# Patient Record
Sex: FEMALE | Race: WHITE | Employment: UNEMPLOYED | URBAN - NONMETROPOLITAN AREA
[De-identification: names, ages, dates, MRNs, and addresses within clinical notes are randomized per-mention and may not be internally consistent; named-entity substitution may affect disease eponyms.]

---

## 2021-04-07 ENCOUNTER — OFFICE VISIT (OUTPATIENT)
Dept: INTERNAL MEDICINE CLINIC | Age: 21
End: 2021-04-07
Payer: COMMERCIAL

## 2021-04-07 VITALS
SYSTOLIC BLOOD PRESSURE: 111 MMHG | BODY MASS INDEX: 36.14 KG/M2 | DIASTOLIC BLOOD PRESSURE: 72 MMHG | WEIGHT: 204 LBS | TEMPERATURE: 97.1 F | HEIGHT: 63 IN | HEART RATE: 91 BPM

## 2021-04-07 DIAGNOSIS — F11.20 SEVERE OPIOID USE DISORDER (HCC): Primary | ICD-10-CM

## 2021-04-07 DIAGNOSIS — Z3A.27 27 WEEKS GESTATION OF PREGNANCY: ICD-10-CM

## 2021-04-07 PROCEDURE — G8417 CALC BMI ABV UP PARAM F/U: HCPCS | Performed by: INTERNAL MEDICINE

## 2021-04-07 PROCEDURE — 80305 DRUG TEST PRSMV DIR OPT OBS: CPT | Performed by: INTERNAL MEDICINE

## 2021-04-07 PROCEDURE — 99204 OFFICE O/P NEW MOD 45 MIN: CPT | Performed by: INTERNAL MEDICINE

## 2021-04-07 PROCEDURE — 4004F PT TOBACCO SCREEN RCVD TLK: CPT | Performed by: INTERNAL MEDICINE

## 2021-04-07 PROCEDURE — G8427 DOCREV CUR MEDS BY ELIG CLIN: HCPCS | Performed by: INTERNAL MEDICINE

## 2021-04-07 RX ORDER — LEVOTHYROXINE SODIUM 75 UG/1
CAPSULE ORAL
COMMUNITY

## 2021-04-07 RX ORDER — TRAZODONE HYDROCHLORIDE 50 MG/1
TABLET ORAL
COMMUNITY
Start: 2020-06-22

## 2021-04-07 RX ORDER — FLUOXETINE HYDROCHLORIDE 20 MG/1
40 CAPSULE ORAL DAILY
COMMUNITY

## 2021-04-07 RX ORDER — QUETIAPINE FUMARATE 100 MG/1
25 TABLET, FILM COATED ORAL NIGHTLY
COMMUNITY
Start: 2020-09-01

## 2021-04-07 SDOH — HEALTH STABILITY: MENTAL HEALTH: HOW MANY STANDARD DRINKS CONTAINING ALCOHOL DO YOU HAVE ON A TYPICAL DAY?: NOT ASKED

## 2021-04-07 SDOH — ECONOMIC STABILITY: TRANSPORTATION INSECURITY
IN THE PAST 12 MONTHS, HAS LACK OF TRANSPORTATION KEPT YOU FROM MEETINGS, WORK, OR FROM GETTING THINGS NEEDED FOR DAILY LIVING?: NOT ASKED

## 2021-04-07 SDOH — ECONOMIC STABILITY: FOOD INSECURITY: WITHIN THE PAST 12 MONTHS, YOU WORRIED THAT YOUR FOOD WOULD RUN OUT BEFORE YOU GOT MONEY TO BUY MORE.: PATIENT DECLINED

## 2021-04-07 ASSESSMENT — PATIENT HEALTH QUESTIONNAIRE - PHQ9
SUM OF ALL RESPONSES TO PHQ9 QUESTIONS 1 & 2: 1
SUM OF ALL RESPONSES TO PHQ QUESTIONS 1-9: 1

## 2021-04-07 NOTE — PROGRESS NOTES
MEDICATION ASSISTED TREATMENT ENCOUNTER    HISTORY OF PRESENT ILLNESS  Patient presents for evaluation of opioid use disorder and wants to be placed on medication assisted treatment  Patient is referred here by the [facility] program urges with her  Patient has had problems using heroin, pain pills  Patient started using she said she started heroin at age 16  Was using pain pills at age 16-14    Patient says she is 32 weeks pregnant she is seeing Dr. Divya Reynolds  She said her last use of an opiate was November 4 she used Percocet and Oxy  Other drugs used: THC prior to her pregnancy  Suboxone programs in the past no  Vivitrol in the past no  Patient says she has been having some strong urges to use   says she actually contacted her dealer  He did not did not show up and she said she coped with it  Patient would typically use she was snorted a line of PERC/Inocente daily  Ever used Suboxone off the street no  She had 1 rehab stent in [place] last year, she was there 9 days  Past Medical History:   Diagnosis Date    ADHD     Anxiety     [facility]     Bulimia     [facility]ce[facility]     Hypertension     Hypothyroid     OCD (obsessive compulsive disorder)     Panic disorder     PTSD (post-traumatic stress disorder)        Past Surgical History:   Procedure Laterality Date    ACHILLES TENDON SURGERY      WISDOM TOOTH EXTRACTION         PAST PSYCHIATRIC HISTORY: OCD, PTSD, panic disorder, bipolar    No Known Allergies    Current Outpatient Medications   Medication Sig Dispense Refill    Levothyroxine Sodium 75 MCG CAPS Take by mouth      FLUoxetine (PROZAC) 20 MG capsule Take 40 mg by mouth daily      QUEtiapine (SEROQUEL) 100 MG tablet Take 75 mg by mouth nightly      traZODone (DESYREL) 50 MG tablet       Prenatal Vit-DSS-Fe Cbn-FA (PRENATAL AD PO) Take by mouth      buprenorphine (SUBUTEX) 2 MG SUBL SL tablet Place 2 tablets under the tongue 2 times daily for 4 days. 16 tablet 0     No current facility-administered medications for this visit. 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     Support system biological mother and boyfriend are supportive, stepparents are somewhat supportive     Legal issues no retirement or FDC     Tobacco: Smoking about 4 months ago     Alcohol no alcohol since October ROS     General: Patient denies fevers, chills ,weight changes, sweats     Psych: No depression, anxiety, suicidal ideation or attempts     Endocrine: No thyroid issues,no neck pain, no galactorrhea, no weight changes     Pulmonary: No shortness of breath, orthopnea, PND     Cardiac: No chest pain,syncope, no history of cardiac issues     GI: No trouble with bowels, no abdominal pain     : No dysuria, nocturia, urgency, frequency     MS: Patient denies bone or joint aches, no myalgias     Neuro: Patient denies headaches, seizures, tremors     Skin: No skin lesions, rashes    PHYSICAL EXAM    Blood pressure 111/72, pulse 91, temperature 97.1 °F (36.2 °C), height 5' 3\" (1.6 m), weight 204 lb (92.5 kg).              General: Patient resting comfortably in no acute distress     Mental Status Examination:  Level of consciousness:  within normal limits and awake  Appearance:  well-appearing, in chair, good grooming and good hygiene  Behavior/Motor:  {Normal  Attitude toward examiner:  cooperative and attentive  Speech:  spontaneous and normal volume  Mood: normal  Affect:  appropriate  Thought processes:  linear  Thought content:  Denies homicidal ideations  Suicidal Ideation:  denies suicidal ideation  Delusions:  no evidence of delusions  Perceptual Disturbance:  denies any perceptual disturbance  Cognition:  oriented to person, place, and time    Insight : good Judgment: good  Medication Side Effects:none       Eyes: Pupils are normal         Skin: No rashes, lesions or abnormalities noted        URINE DRUG SCREEN TODAY:  Alcohol, Urine 04/08/2021 11:24 AM Unknown   NEG    Amphetamine Screen, Urine 04/08/2021 11:24 AM Unknown   NEG    Barbiturate Screen, Urine 04/08/2021 11:24 AM Unknown   NEG    Benzodiazepine Screen, Urine 04/08/2021 11:24 AM Unknown   NEG    Buprenorphine Urine 04/08/2021 11:24 AM Unknown   POS    Cocaine Metabolite Screen, Urine 04/08/2021 11:24 AM Unknown   NEG    FENTANYL SCREEN, URINE 04/08/2021 11:24 AM Unknown   NEG    Gabapentin Screen, Urine 04/08/2021 11:24 AM Unknown   N/A    MDMA, Urine 04/08/2021 11:24 AM Unknown   NEG    Methadone Screen, Urine 04/08/2021 11:24 AM Unknown   NEG    Methamphetamine, Urine 04/08/2021 11:24 AM Unknown   NEG    Opiate Scrn, Ur 04/08/2021 11:24 AM Unknown   NEG    Oxycodone Screen, Ur 04/08/2021 11:24 AM Unknown   NEG    PCP Screen, Urine 04/08/2021 11:24 AM Unknown   NEG    Propoxyphene Screen, Urine 04/08/2021 11:24 AM Unknown   N/A    Synthetic Cannabinoids (K2) Screen, Urine 04/08/2021 11:24 AM Unknown   NEG    THC Screen, Urine 04/08/2021 11:24 AM Unknown   NEG    Tramadol Scrn, Ur 04/08/2021 11:24 AM Unknown   NEG    Tricyclic Antidepressants, Urine 04/08/2021 11:24 AM Unknown   N/A             Diagnosis Orders   1. Severe opioid use disorder (HCC)  POCT Rapid Drug Screen   2. 27 weeks gestation of pregnancy           PLAN:  Provider provided education on Medication Assisted Treatment options, including: Suboxone, Sublocade, Methadone, and Naltrexone/Vivitrol  Allowed opportunity to respond to questions regarding the treatment options. Patient voices that their treatment preference is suboxone. I feel that this patient is an appropriate candidate for this treatment option.      Education given on the importance of combining Medication Assisted Treatment with comprehensive treatment, including: individual counseling, treatment groups, community support groups, and psychiatry as applicable. Patient will meet with  to review clinic counseling expectations and to be linked to appropriate services. Provider reviewed medication contract with patient. Patient is agreeable to the program expectations. Both patient and provider signed the medication contract. Patient instructed to go to [address] [place] [address] to watch a video and learn about [facility]. I told patient [facility] is an opioid antagonist that reverses respiratory depression caused by opioids. Pharmacy will give patient or family member [facility] and explain how to use in an emergency.   I reviewed the [place] Automated [facility] Reporting System report     There does not appear to be any discrepancies or overprescribing of controlled substances  Patient was given Subutex 4 mg  She was monitored for 15 minutes  There were no acute complications  I will give her 4 mg twice daily see her back tomorrow  She is here with her

## 2021-04-08 ENCOUNTER — OFFICE VISIT (OUTPATIENT)
Dept: INTERNAL MEDICINE CLINIC | Age: 21
End: 2021-04-08
Payer: COMMERCIAL

## 2021-04-08 VITALS
BODY MASS INDEX: 36.32 KG/M2 | SYSTOLIC BLOOD PRESSURE: 136 MMHG | TEMPERATURE: 97.4 F | HEIGHT: 63 IN | WEIGHT: 205 LBS | HEART RATE: 79 BPM | DIASTOLIC BLOOD PRESSURE: 88 MMHG

## 2021-04-08 DIAGNOSIS — Z51.81 ENCOUNTER FOR MONITORING SUBUTEX MAINTENANCE THERAPY: ICD-10-CM

## 2021-04-08 DIAGNOSIS — Z79.899 ENCOUNTER FOR MONITORING SUBUTEX MAINTENANCE THERAPY: ICD-10-CM

## 2021-04-08 DIAGNOSIS — Z3A.27 27 WEEKS GESTATION OF PREGNANCY: ICD-10-CM

## 2021-04-08 DIAGNOSIS — F11.20 SEVERE OPIOID USE DISORDER (HCC): Primary | ICD-10-CM

## 2021-04-08 LAB
ALCOHOL URINE: ABNORMAL
AMPHETAMINE SCREEN, URINE: ABNORMAL
BARBITURATE SCREEN, URINE: ABNORMAL
BENZODIAZEPINE SCREEN, URINE: ABNORMAL
BUPRENORPHINE URINE: ABNORMAL
COCAINE METABOLITE SCREEN URINE: ABNORMAL
FENTANYL SCREEN, URINE: ABNORMAL
GABAPENTIN SCREEN, URINE: ABNORMAL
MDMA URINE: ABNORMAL
METHADONE SCREEN, URINE: ABNORMAL
METHAMPHETAMINE, URINE: ABNORMAL
OPIATE SCREEN URINE: ABNORMAL
OXYCODONE SCREEN URINE: ABNORMAL
PHENCYCLIDINE SCREEN URINE: ABNORMAL
PROPOXYPHENE SCREEN, URINE: ABNORMAL
SYNTHETIC CANNABINOIDS(K2) SCREEN, URINE: ABNORMAL
THC SCREEN, URINE: ABNORMAL
TRAMADOL SCREEN URINE: ABNORMAL
TRICYCLIC ANTIDEPRESSANTS, UR: ABNORMAL

## 2021-04-08 PROCEDURE — 99213 OFFICE O/P EST LOW 20 MIN: CPT | Performed by: INTERNAL MEDICINE

## 2021-04-08 PROCEDURE — 80305 DRUG TEST PRSMV DIR OPT OBS: CPT | Performed by: INTERNAL MEDICINE

## 2021-04-08 PROCEDURE — G8427 DOCREV CUR MEDS BY ELIG CLIN: HCPCS | Performed by: INTERNAL MEDICINE

## 2021-04-08 PROCEDURE — 4004F PT TOBACCO SCREEN RCVD TLK: CPT | Performed by: INTERNAL MEDICINE

## 2021-04-08 PROCEDURE — G8417 CALC BMI ABV UP PARAM F/U: HCPCS | Performed by: INTERNAL MEDICINE

## 2021-04-08 RX ORDER — BUPRENORPHINE 2 MG/1
4 TABLET SUBLINGUAL 2 TIMES DAILY
Qty: 16 TABLET | Refills: 0 | Status: SHIPPED | OUTPATIENT
Start: 2021-04-08 | End: 2021-04-12 | Stop reason: SDUPTHER

## 2021-04-08 NOTE — PROGRESS NOTES
MEDICATION ASSISTED TREATMENT ENCOUNTER    HISTORY OF PRESENT ILLNESS  Patient presents for evaluation of opioid use disorder and wants to be placed on medication assisted treatment  [facility]   I saw him here for the first time 4/7  We did a Subutex induction I sent her home with 2 doses to last until today  Apparently she had some nausea and vomiting when she got back to [facility]  The nurse there took it upon herself to just hold her Subutex  Patient has had problems using heroin, pain pills  Patient started using she said she started heroin at age 16  Was using pain pills at age [age]    Patient says she is 32 weeks pregnant she is seeing Dr. Octavio Nolan  She said her last use of an opiate was November 4 she used Percocet and Oxy  Other drugs used: THC prior to her pregnancy  Suboxone programs in the past no  Vivitrol in the past no  Patient says she has been having some strong urges to use   says she actually contacted her dealer  He did not did not show up and she said she coped with it  Patient would typically use she was snorted a line of PERC/Oxy daily  Ever used Suboxone off the street no  She had 1 rehab stent in [place] last year, she was there 9 days  Past Medical History:   Diagnosis Date    ADHD     Anxiety     [facility]     Bulimia     [facility]     Hypertension     Hypothyroid     OCD (obsessive compulsive disorder)     Panic disorder     PTSD (post-traumatic stress disorder)              ROS     General:Patient is feeling well  Patient is not experiencing  withdrawal symptoms ,no urges or cravings  Patient is not having any side effects from the buprenorphine     PHYSICAL EXAM    Blood pressure 136/88, pulse 79, temperature 97.4 °F (36.3 °C), height 5' 3\" (1.6 m), weight 205 lb (93 kg).              General: Patient resting comfortably in no acute distress     Mental Status Examination:  Level of consciousness:  within normal limits and awake  Appearance:  well-appearing, in chair, good grooming and good hygiene  Behavior/Motor:  {Normal  Attitude toward examiner:  cooperative and attentive  Speech:  spontaneous and normal volume  Mood: normal  Affect:  appropriate  Thought processes:  linear  Thought content:  Denies homicidal ideations  Suicidal Ideation:  denies suicidal ideation  Delusions:  no evidence of delusions  Perceptual Disturbance:  denies any perceptual disturbance  Cognition:  oriented to person, place, and time    Insight : good  Judgment: good  Medication Side Effects:none       Eyes: Pupils are normal         Skin: No rashes, lesions or abnormalities noted        URINE DRUG SCREEN TODAY:  Recent Labs     04/08/21  1124   ALCOHOL NEG   LABAMPH NEG   LABBARB NEG   LABBENZ NEG   BUPRENUR POS   COCAIMETSCRU NEG   FENTSCRUR NEG   GABAPENTIN N/A   MDMA NEG   METAMPU NEG   LABMETH NEG   OPIATESCREENURINE NEG   OXTCOSU NEG   PHENCYCLIDINESCREENURINE NEG   PROPOXYPHENE N/A   SPICEUR NEG   THCSCREENUR NEG   TRAMADOLUR NEG   TRICYUR N/A           Diagnosis Orders   1. Severe opioid use disorder (HCC)  POCT Rapid Drug Screen    buprenorphine (SUBUTEX) 2 MG SUBL SL tablet   2. 27 weeks gestation of pregnancy     3.  Encounter for monitoring Subutex maintenance therapy           PLAN:  I [facility] with [facility] who told her to hold the medicine  She said she had nausea and vomiting  I reminded her that the patient is pregnant  She had no adverse reaction yesterday here after she was given Subutex  The patient says that she told her that her baby is going to be born addicted being on Subutex  She denies to me that she told her that  I recommend Subutex 4 mg twice daily  I will see her back on Monday

## 2021-04-08 NOTE — PROGRESS NOTES
Verbal order per Dr. Javy Mcqueen for urine drug screen. Positive for BUP. Verified results with Irina Guerrero RN. Dr. Javy Mcqueen ordered Subutex 8mg tab (2 tabs BID)  for patient. Verified dose with patient. Patient was sent home with 4 day script of Subutex 8mg tab (2 tabs BID) and will be seen back in the office 4/12/21.

## 2021-04-09 ENCOUNTER — TELEPHONE (OUTPATIENT)
Dept: INTERNAL MEDICINE CLINIC | Age: 21
End: 2021-04-09

## 2021-04-12 ENCOUNTER — OFFICE VISIT (OUTPATIENT)
Dept: INTERNAL MEDICINE CLINIC | Age: 21
End: 2021-04-12
Payer: COMMERCIAL

## 2021-04-12 VITALS
BODY MASS INDEX: 35.97 KG/M2 | WEIGHT: 203 LBS | TEMPERATURE: 97.7 F | HEART RATE: 98 BPM | SYSTOLIC BLOOD PRESSURE: 112 MMHG | DIASTOLIC BLOOD PRESSURE: 68 MMHG | HEIGHT: 63 IN

## 2021-04-12 DIAGNOSIS — Z79.899 ENCOUNTER FOR MONITORING SUBUTEX MAINTENANCE THERAPY: ICD-10-CM

## 2021-04-12 DIAGNOSIS — Z51.81 ENCOUNTER FOR MONITORING SUBUTEX MAINTENANCE THERAPY: ICD-10-CM

## 2021-04-12 DIAGNOSIS — F11.20 SEVERE OPIOID USE DISORDER (HCC): Primary | ICD-10-CM

## 2021-04-12 DIAGNOSIS — Z3A.15 15 WEEKS GESTATION OF PREGNANCY: ICD-10-CM

## 2021-04-12 PROCEDURE — 4004F PT TOBACCO SCREEN RCVD TLK: CPT | Performed by: INTERNAL MEDICINE

## 2021-04-12 PROCEDURE — G8417 CALC BMI ABV UP PARAM F/U: HCPCS | Performed by: INTERNAL MEDICINE

## 2021-04-12 PROCEDURE — G8427 DOCREV CUR MEDS BY ELIG CLIN: HCPCS | Performed by: INTERNAL MEDICINE

## 2021-04-12 PROCEDURE — 80305 DRUG TEST PRSMV DIR OPT OBS: CPT | Performed by: INTERNAL MEDICINE

## 2021-04-12 PROCEDURE — 99213 OFFICE O/P EST LOW 20 MIN: CPT | Performed by: INTERNAL MEDICINE

## 2021-04-12 RX ORDER — BUPRENORPHINE 2 MG/1
4 TABLET SUBLINGUAL 2 TIMES DAILY
Qty: 56 TABLET | Refills: 0 | Status: SHIPPED | OUTPATIENT
Start: 2021-04-12 | End: 2021-04-26 | Stop reason: SDUPTHER

## 2021-04-12 NOTE — PROGRESS NOTES
MEDICATION ASSISTED TREATMENT ENCOUNTER    HISTORY OF PRESENT ILLNESS  Patient presents for evaluation of opioid use disorder and wants to be placed on medication assisted treatment  [facility] is [facility] by [facility]   I saw him here for the first time 4/7  We did a Subutex induction I sent her home with 2 doses to last until last visit  Apparently she had some nausea and vomiting when she got back to [facility]  The nurse there took it upon herself to just hold her Subutex  I spoke with the director last week told her that was not appropriate without speaking to the physician  She said she was having nausea, I reminded her she is pregnant  Patient has had problems using heroin, pain pills  Patient started using she said she started heroin at age 16  Was using pain pills at age [age]    Patient says she is 32 weeks pregnant she is seeing Dr. Hannah Maldonado  She said her last use of an opiate was November 4 she used Percocet and Oxy  Other drugs used: THC prior to her pregnancy  Suboxone programs in the past no  Vivitrol in the past no  Patient says she has been having some strong urges to use   says she actually contacted her dealer  He did not did not show up and she said she coped with it  Patient would typically use she was snorted a line of PERC/Oxy daily  Ever used Suboxone off the street no  She had 1 rehab stent in [place] last year, she was there 9 days  Past Medical History:   Diagnosis Date    ADHD     Anxiety     [facility] ([facility]     Bulimia     [facility]cerebral palsy) ([facility]     Hypertension     Hypothyroid     OCD (obsessive compulsive disorder)     Panic disorder     PTSD (post-traumatic stress disorder)              ROS     General:Patient is feeling well  Patient is not experiencing  withdrawal symptoms ,no urges or cravings  Patient is not having any side effects from the buprenorphine     PHYSICAL EXAM    Blood pressure 112/68, pulse 98, temperature 97.7 °F (36.5 °C), height 5' 3\" (1.6 m), weight 203 lb (92.1 kg). General: Patient resting comfortably in no acute distress     Mental Status Examination:  Level of consciousness:  within normal limits and awake  Appearance:  well-appearing, in chair, good grooming and good hygiene  Behavior/Motor:  {Normal  Attitude toward examiner:  cooperative and attentive  Speech:  spontaneous and normal volume  Mood: normal  Affect:  appropriate  Thought processes:  linear  Thought content:  Denies homicidal ideations  Suicidal Ideation:  denies suicidal ideation  Delusions:  no evidence of delusions  Perceptual Disturbance:  denies any perceptual disturbance  Cognition:  oriented to person, place, and time    Insight : good  Judgment: good  Medication Side Effects:none       Eyes: Pupils are normal         Skin: No rashes, lesions or abnormalities noted        URINE DRUG SCREEN TODAY:  Recent Labs     04/12/21  1330   ALCOHOL NEG   LABAMPH NEG   LABBARB NEG   LABBENZ NEG   BUPRENUR POS   COCAIMETSCRU NEG   FENTSCRUR NEG   GABAPENTIN N/A   MDMA NEG   METAMPU NEG   LABMETH NEG   OPIATESCREENURINE NEG   OXTCOSU NEG   PHENCYCLIDINESCREENURINE NEG   PROPOXYPHENE N/A   SPICEUR NEG   THCSCREENUR NEG   TRAMADOLUR NEG   TRICYUR N/A           Diagnosis Orders   1. Severe opioid use disorder (HCC)  POCT Rapid Drug Screen    buprenorphine (SUBUTEX) 2 MG SUBL SL tablet   2.  Encounter for monitoring Subutex maintenance therapy     3. 15 weeks gestation of pregnancy           PLAN:  Patient is doing well on the current dose  She has no urges or cravings    I recommend Subutex 4 mg twice daily  I will see her back in 2 weeks

## 2021-04-12 NOTE — TELEPHONE ENCOUNTER
Received call from patient and  Marcos Mayorga. She is unable to fill her Subutex, she tells me they need to verify that she is pregnant. Reviewed notes from Dr. Desmond Juan, and called [address] [place] [address]. They advised me, her Subutex is a prior Auth. The office is closed, I am unable to provide samples. The total cost of her prescription will be just under $15.  Returned call to [facility] and explained the situation that she  would need to pay cash for her prescription over the weekend. They verbalized understanding.

## 2021-04-12 NOTE — PROGRESS NOTES
Verbal order per Dr. Jonn Llamas for urine drug screen. Positive for BUP. Verified results with Marcel Anderson LPN. Dr. Jonn Llamas ordered Subutex 4 mg BID for patient. Verified dose with patient. Patient was sent home with 1 week script for Subutex 2 mg tab (2 tabs BID) and will be seen back in the office on 4/26/21. Voucher sent.

## 2021-04-15 ENCOUNTER — HOSPITAL ENCOUNTER (OUTPATIENT)
Age: 21
Discharge: HOME | End: 2021-04-16
Attending: OBSTETRICS & GYNECOLOGY | Admitting: OBSTETRICS & GYNECOLOGY
Payer: COMMERCIAL

## 2021-04-16 VITALS
RESPIRATION RATE: 20 BRPM | WEIGHT: 203 LBS | TEMPERATURE: 98.1 F | HEART RATE: 63 BPM | DIASTOLIC BLOOD PRESSURE: 79 MMHG | HEIGHT: 63 IN | BODY MASS INDEX: 35.97 KG/M2 | OXYGEN SATURATION: 97 % | SYSTOLIC BLOOD PRESSURE: 127 MMHG

## 2021-04-16 PROBLEM — R10.9 ABDOMINAL PAIN: Status: ACTIVE | Noted: 2021-04-16

## 2021-04-16 LAB
BACTERIA: ABNORMAL
BILIRUBIN URINE: NEGATIVE
BLOOD, URINE: NEGATIVE
CASTS: ABNORMAL /LPF
CASTS: ABNORMAL /LPF
CHARACTER, URINE: CLEAR
COLOR: YELLOW
CRYSTALS: ABNORMAL
EPITHELIAL CELLS, UA: ABNORMAL /HPF
GLUCOSE, URINE: NEGATIVE MG/DL
KETONES, URINE: NEGATIVE
LEUKOCYTE EST, POC: ABNORMAL
MISCELLANEOUS LAB TEST RESULT: ABNORMAL
NITRITE, URINE: NEGATIVE
PH UA: 7.5 (ref 5–9)
PROTEIN UA: NEGATIVE MG/DL
RBC URINE: ABNORMAL /HPF
RENAL EPITHELIAL, UA: ABNORMAL
SPECIFIC GRAVITY UA: 1.01 (ref 1–1.03)
UROBILINOGEN, URINE: 1 EU/DL (ref 0–1)
WBC UA: ABNORMAL /HPF
YEAST: ABNORMAL

## 2021-04-16 PROCEDURE — 6370000000 HC RX 637 (ALT 250 FOR IP): Performed by: STUDENT IN AN ORGANIZED HEALTH CARE EDUCATION/TRAINING PROGRAM

## 2021-04-16 PROCEDURE — 81001 URINALYSIS AUTO W/SCOPE: CPT

## 2021-04-16 RX ORDER — ACETAMINOPHEN 325 MG/1
650 TABLET ORAL EVERY 4 HOURS PRN
Status: DISCONTINUED | OUTPATIENT
Start: 2021-04-16 | End: 2021-04-16 | Stop reason: HOSPADM

## 2021-04-16 RX ORDER — AZITHROMYCIN 250 MG/1
500 TABLET, FILM COATED ORAL ONCE
Status: COMPLETED | OUTPATIENT
Start: 2021-04-16 | End: 2021-04-16

## 2021-04-16 RX ADMIN — AZITHROMYCIN 500 MG: 250 TABLET, FILM COATED ORAL at 00:57

## 2021-04-16 RX ADMIN — ACETAMINOPHEN 650 MG: 325 TABLET ORAL at 00:45

## 2021-04-16 NOTE — FLOWSHEET NOTE
Pt states she only got one dose of her subutex in yesterday.   Pt was here waiting for her mother in recovery and had all her other medications on her, but was unable to take her subutex until she got home from the hospital.

## 2021-04-16 NOTE — PLAN OF CARE
Problem: Healthcare acquired conditions:  Goal: Absence of healthcare acquired conditions  Description: Absence of healthcare acquired conditions  Outcome: Ongoing  Note: No healthcare acquired conditions. Problem: Discharge Planning:  Goal: Discharged to appropriate level of care  Description: Discharged to appropriate level of care  Outcome: Ongoing  Note: Plan to return home at discharge. Care plan reviewed with patient. Patient verbalizes understanding of the plan of care and contribute to goal setting.

## 2021-04-16 NOTE — FLOWSHEET NOTE
Pt of Dr. Carri Enamorado arrived to [phone],  28w3d, c/o left sided pain that radiates to her lower abdomen rated a 7/10 that started @ 2245. Pt denies leaking of fluid and vaginal bleeding. Pt states she has not felt baby move since the pain started and had decreased fetal movement prior to that, but it could be because she is stressed out and anxious, pt states. Pt's mother is in the hospital s/p hip replacement. Pt states she was seen on the  and baby was fine. Pt states she was diagnosed with gonorrhea at that visit and was started on an antibiotic that she took on the  and vomited it up. They re-called a script in she is supposed to  tomorrow. Pt states she has not been eating much or drank much fluids recently. Pt states her blood pressures have been up slightly in the office, BP en route is WDL and pt denies any headache, blurred vision, or spots before the eyes. Pt oriented to room and call light system and assisted pt into gown.

## 2021-04-16 NOTE — FLOWSHEET NOTE
Straight cath performed to obtained urinalysis at this time, 400 mL pale yellow urine emptied from bladder.

## 2021-04-16 NOTE — FLOWSHEET NOTE
Pt transported via wheelchair to [facility] at this time. [facility] has been contacted for pt and should arrive within 15 min.

## 2021-04-16 NOTE — FLOWSHEET NOTE
Audible fetal movement noted from external US, pt notes feeling some of the fetal movement. Pt tolerating PO hydration at this time.

## 2021-04-16 NOTE — FLOWSHEET NOTE
Dr. Niki Alfaro updated urinalysis results. Pt's pain is a 4-5/10 with a pain goal of 6. Pt only took one dose of subutex yesterday. FHTs reactive. Orders received.

## 2021-04-16 NOTE — PROGRESS NOTES
Department of Obstetrics and Gynecology  Labor and Delivery   Triage Note      Pt Name: Chyna Carson  MRN: [id number] [place] #: [de-identified]  YOB: 2000  Procedure Performed By: Yang Zavaleta MD      SUBJECTIVE: 76-year-old  at 29 3/7 weeks gestation with PMH of CP, hypothyroidism, bipolar disorder, and prior substance abuse on subutex who presented via ambulance with acute onset left-sided lower abdominal pain that started at 2245. She initially rated it at a 7/10. She endorsed some nausea, low appetite, but no vomiting. Also reported decreased fetal movement since abdominal pain began. Denied leakage of fluids, contractions, vaginal bleeding or discharge. She was recently seen in the office 21 and treated for gonorrhea with Rocephin. She states she was unable to tolerate the PO azithromax and was due to  the new script tomorrow. States she has not been eating or drinking much lately due to increased stress with her mom being hospitalized. Also reporting some elevated blood pressures in the pregnancy, but has not required medications. Denies headache, blurred vision. Also denied chest pain, shortness of breath, swelling. OBJECTIVE  She is at 29 and 3/7 weeks gestation   Vitals:  Ht 5' 3\" (1.6 m)   Wt 203 lb (92.1 kg)   BMI 35.96 kg/m²     CONSTITUTIONAL:  awake, alert, cooperative, no apparent distress, and appears stated age  ABDOMEN:  No scars, normal bowel sounds, soft, non-distended, non-tender, no masses palpated, no hepatosplenomegally  Cervical exam deferred. NST is reactive    Fetal heart rate:         Baseline Heart Rate: 130s       Accelerations:  present       Decelerations:  none       Variability:  moderate    Contraction frequency: 0 minutes    DATA:  UA pending    ASSESSMENT & PLAN:    Discussed with Dr. Carmen Boogie. Abdominal pain with benign abdominal exam.   FHTs reassuring. No contractions per toco. Vital signs stable.   Known gonorrheal infection treated in office with Rocephin. Was unable to tolerate PO Zithromax. Will treat with 1 gram PO Zithromax x 1 here. Obtain UA to look for UTI or signs of urolithiasis. Encouraged to stay hydrated. Tylenol for pain relief.      Bibi Hem 4/16/2021 12:31 AM

## 2021-04-16 NOTE — FLOWSHEET NOTE
Discharge instructions given and reviewed with patient. Informed patient to notify physican or come back to [facility] if leaking fluid from vagina with or without contractions. Bright red vaginal bleeding occurs that is as heavy as or heavier than a period. Regular contractions that are 2-5 minutes apart that are longer, stronger, and closer together. Decreased fetal movement. Elevated temperature >100.5°F and chills. Blurred vision, spots before eyes, unrelievable headache, severe facial swelling, or upper abdominal pain. Questions denied, papers signed.

## 2021-04-26 ENCOUNTER — OFFICE VISIT (OUTPATIENT)
Dept: INTERNAL MEDICINE CLINIC | Age: 21
End: 2021-04-26
Payer: COMMERCIAL

## 2021-04-26 VITALS
DIASTOLIC BLOOD PRESSURE: 64 MMHG | WEIGHT: 200 LBS | BODY MASS INDEX: 35.44 KG/M2 | HEART RATE: 71 BPM | TEMPERATURE: 96.8 F | HEIGHT: 63 IN | SYSTOLIC BLOOD PRESSURE: 113 MMHG

## 2021-04-26 DIAGNOSIS — Z3A.30 30 WEEKS GESTATION OF PREGNANCY: ICD-10-CM

## 2021-04-26 DIAGNOSIS — F11.20 SEVERE OPIOID USE DISORDER (HCC): Primary | ICD-10-CM

## 2021-04-26 DIAGNOSIS — Z79.899 ENCOUNTER FOR MONITORING SUBUTEX MAINTENANCE THERAPY: ICD-10-CM

## 2021-04-26 DIAGNOSIS — Z51.81 ENCOUNTER FOR MONITORING SUBUTEX MAINTENANCE THERAPY: ICD-10-CM

## 2021-04-26 PROCEDURE — G8417 CALC BMI ABV UP PARAM F/U: HCPCS | Performed by: INTERNAL MEDICINE

## 2021-04-26 PROCEDURE — 4004F PT TOBACCO SCREEN RCVD TLK: CPT | Performed by: INTERNAL MEDICINE

## 2021-04-26 PROCEDURE — 80305 DRUG TEST PRSMV DIR OPT OBS: CPT | Performed by: INTERNAL MEDICINE

## 2021-04-26 PROCEDURE — 99213 OFFICE O/P EST LOW 20 MIN: CPT | Performed by: INTERNAL MEDICINE

## 2021-04-26 PROCEDURE — G8428 CUR MEDS NOT DOCUMENT: HCPCS | Performed by: INTERNAL MEDICINE

## 2021-04-26 RX ORDER — BUPRENORPHINE 2 MG/1
4 TABLET SUBLINGUAL 2 TIMES DAILY
Qty: 56 TABLET | Refills: 0 | Status: SHIPPED | OUTPATIENT
Start: 2021-04-26 | End: 2021-05-10 | Stop reason: SDUPTHER

## 2021-04-26 NOTE — PROGRESS NOTES
Verbal order per Dr. Javy Mcqueen for urine drug screen. Positive for BUP. Verified results with Irina Guerrero RN. Dr. Javy Mcqueen ordered Subutex 2mg tab (2 tabs BID) for patient. Verified dose with patient. Patient was sent home with 2 week script of Subutex 2mg tab (2 tabs BID) and will be seen back in the office 5/10/21.
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
## 2021-05-10 ENCOUNTER — OFFICE VISIT (OUTPATIENT)
Dept: INTERNAL MEDICINE CLINIC | Age: 21
End: 2021-05-10
Payer: COMMERCIAL

## 2021-05-10 VITALS
DIASTOLIC BLOOD PRESSURE: 67 MMHG | TEMPERATURE: 97.3 F | BODY MASS INDEX: 35.44 KG/M2 | WEIGHT: 200 LBS | HEART RATE: 70 BPM | SYSTOLIC BLOOD PRESSURE: 111 MMHG | HEIGHT: 63 IN

## 2021-05-10 DIAGNOSIS — Z3A.32 32 WEEKS GESTATION OF PREGNANCY: ICD-10-CM

## 2021-05-10 DIAGNOSIS — Z79.899 ENCOUNTER FOR MONITORING SUBUTEX MAINTENANCE THERAPY: ICD-10-CM

## 2021-05-10 DIAGNOSIS — F11.20 SEVERE OPIOID USE DISORDER (HCC): Primary | ICD-10-CM

## 2021-05-10 DIAGNOSIS — Z51.81 ENCOUNTER FOR MONITORING SUBUTEX MAINTENANCE THERAPY: ICD-10-CM

## 2021-05-10 PROCEDURE — 4004F PT TOBACCO SCREEN RCVD TLK: CPT | Performed by: INTERNAL MEDICINE

## 2021-05-10 PROCEDURE — G8427 DOCREV CUR MEDS BY ELIG CLIN: HCPCS | Performed by: INTERNAL MEDICINE

## 2021-05-10 PROCEDURE — G8417 CALC BMI ABV UP PARAM F/U: HCPCS | Performed by: INTERNAL MEDICINE

## 2021-05-10 PROCEDURE — 99213 OFFICE O/P EST LOW 20 MIN: CPT | Performed by: INTERNAL MEDICINE

## 2021-05-10 PROCEDURE — 80305 DRUG TEST PRSMV DIR OPT OBS: CPT | Performed by: INTERNAL MEDICINE

## 2021-05-10 RX ORDER — BUPRENORPHINE 2 MG/1
4 TABLET SUBLINGUAL 2 TIMES DAILY
Qty: 56 TABLET | Refills: 0 | Status: SHIPPED | OUTPATIENT
Start: 2021-05-10 | End: 2021-05-24 | Stop reason: SDUPTHER
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
## 2021-05-24 ENCOUNTER — OFFICE VISIT (OUTPATIENT)
Dept: INTERNAL MEDICINE CLINIC | Age: 21
End: 2021-05-24
Payer: COMMERCIAL

## 2021-05-24 VITALS
WEIGHT: 201 LBS | HEART RATE: 88 BPM | SYSTOLIC BLOOD PRESSURE: 112 MMHG | DIASTOLIC BLOOD PRESSURE: 81 MMHG | TEMPERATURE: 97.6 F | HEIGHT: 63 IN | BODY MASS INDEX: 35.61 KG/M2

## 2021-05-24 DIAGNOSIS — Z79.899 ENCOUNTER FOR MONITORING SUBUTEX MAINTENANCE THERAPY: ICD-10-CM

## 2021-05-24 DIAGNOSIS — Z3A.34 34 WEEKS GESTATION OF PREGNANCY: ICD-10-CM

## 2021-05-24 DIAGNOSIS — F11.20 SEVERE OPIOID USE DISORDER (HCC): Primary | ICD-10-CM

## 2021-05-24 DIAGNOSIS — Z51.81 ENCOUNTER FOR MONITORING SUBUTEX MAINTENANCE THERAPY: ICD-10-CM

## 2021-05-24 PROCEDURE — 80305 DRUG TEST PRSMV DIR OPT OBS: CPT | Performed by: INTERNAL MEDICINE

## 2021-05-24 PROCEDURE — 4004F PT TOBACCO SCREEN RCVD TLK: CPT | Performed by: INTERNAL MEDICINE

## 2021-05-24 PROCEDURE — 99213 OFFICE O/P EST LOW 20 MIN: CPT | Performed by: INTERNAL MEDICINE

## 2021-05-24 PROCEDURE — G8427 DOCREV CUR MEDS BY ELIG CLIN: HCPCS | Performed by: INTERNAL MEDICINE

## 2021-05-24 PROCEDURE — G8417 CALC BMI ABV UP PARAM F/U: HCPCS | Performed by: INTERNAL MEDICINE

## 2021-05-24 RX ORDER — BUPRENORPHINE 2 MG/1
4 TABLET SUBLINGUAL 2 TIMES DAILY
Qty: 56 TABLET | Refills: 0 | Status: SHIPPED | OUTPATIENT
Start: 2021-05-24 | End: 2021-06-07 | Stop reason: SDUPTHER
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
## 2021-06-07 ENCOUNTER — OFFICE VISIT (OUTPATIENT)
Dept: INTERNAL MEDICINE CLINIC | Age: 21
End: 2021-06-07
Payer: COMMERCIAL

## 2021-06-07 VITALS
BODY MASS INDEX: 36.14 KG/M2 | WEIGHT: 204 LBS | HEIGHT: 63 IN | DIASTOLIC BLOOD PRESSURE: 68 MMHG | TEMPERATURE: 97.5 F | HEART RATE: 71 BPM | SYSTOLIC BLOOD PRESSURE: 118 MMHG

## 2021-06-07 DIAGNOSIS — F11.20 SEVERE OPIOID USE DISORDER (HCC): Primary | ICD-10-CM

## 2021-06-07 DIAGNOSIS — Z3A.36 36 WEEKS GESTATION OF PREGNANCY: ICD-10-CM

## 2021-06-07 DIAGNOSIS — Z79.899 ENCOUNTER FOR MONITORING SUBUTEX MAINTENANCE THERAPY: ICD-10-CM

## 2021-06-07 DIAGNOSIS — Z51.81 ENCOUNTER FOR MONITORING SUBUTEX MAINTENANCE THERAPY: ICD-10-CM

## 2021-06-07 PROCEDURE — 80305 DRUG TEST PRSMV DIR OPT OBS: CPT | Performed by: INTERNAL MEDICINE

## 2021-06-07 PROCEDURE — G8427 DOCREV CUR MEDS BY ELIG CLIN: HCPCS | Performed by: INTERNAL MEDICINE

## 2021-06-07 PROCEDURE — 4004F PT TOBACCO SCREEN RCVD TLK: CPT | Performed by: INTERNAL MEDICINE

## 2021-06-07 PROCEDURE — 99213 OFFICE O/P EST LOW 20 MIN: CPT | Performed by: INTERNAL MEDICINE

## 2021-06-07 PROCEDURE — G8417 CALC BMI ABV UP PARAM F/U: HCPCS | Performed by: INTERNAL MEDICINE

## 2021-06-07 RX ORDER — BUPRENORPHINE 2 MG/1
4 TABLET SUBLINGUAL 2 TIMES DAILY
Qty: 56 TABLET | Refills: 0 | Status: SHIPPED | OUTPATIENT
Start: 2021-06-07 | End: 2021-06-22 | Stop reason: SDUPTHER

## 2021-06-07 NOTE — PROGRESS NOTES
Verbal order per Dr. Gongora Reasons for urine drug screen. Positive for BUP. Verified results with Bryson Don LPN. Dr. Gongora Reasons ordered Subutex 4 mg tab BID for patient. Verified dose with patient. Patient was sent home with 2 week script for Subutex 2 mg tab (2 tabs BID) and will be seen back in the office on 6/22/21. UC and oral swab sent.
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
## 2021-06-09 ENCOUNTER — HOSPITAL ENCOUNTER (OUTPATIENT)
Dept: PHYSICAL THERAPY | Age: 21
Setting detail: THERAPIES SERIES
Discharge: HOME | End: 2021-06-09
Payer: COMMERCIAL

## 2021-06-09 PROCEDURE — 97161 PT EVAL LOW COMPLEX 20 MIN: CPT

## 2021-06-09 PROCEDURE — 97110 THERAPEUTIC EXERCISES: CPT

## 2021-06-09 NOTE — PROGRESS NOTES
** PLEASE SIGN, DATE AND TIME CERTIFICATION BELOW AND RETURN TO [facility] OUTPATIENT REHABILITATION (FAX #: [phone]). ATTEST/CO-SIGN IF ACCESSING VIA [employer]. THANK YOU.**    I certify that I have examined the patient below and determined that Physical Medicine and Rehabilitation service is necessary and that I approve the established plan of care for up to 90 days or as specifically noted. Attestation, signature or co-signature of physician indicates approval of certification requirements.    ________________________ ____________ __________  Physician Signature   Date   Time  [address] [facility]  PHYSICAL THERAPY  [x] EVALUATION  [] DAILY NOTE (LAND) [] DAILY NOTE (AQUATIC ) [] PROGRESS NOTE [] DISCHARGE NOTE    [x] [facility]   [] [place] [address]    [] [facility]   [] [facility]    Date: 2021  Patient Name:  Mag Parent  : 2000  MRN: [id number]  CSN: [id number]    Referring Practitioner Kingsley Galvan,*   Diagnosis PT for Cerebral Palsy    Treatment Diagnosis Lower back pain, Spinal stiffness, R knee pain, R knee stiffness, RLE spasticity, RUE spasticity, muscular weakness, R shoulder stiffness, R elbow stiffness. Date of Evaluation 21    Additional Pertinent History Hx of CP from birth, Right sided weakness arm and leg, scoliosis. Hx of fracturing R kneecap, fell on knee. Chronic knee pain, wears knee brace. Heel cord tendon release surgeries , . Functional Outcome Measure Used Lower extremity functional index   Functional Outcome Score 66/80 or 17% impairment (21)       Insurance: Primary: Payor: [address] [place] [address]  [address] /  /  / ,   Secondary:    Authorization Information: 30 visits PT/OT/ST per sweta yr.  No ionto, no heat/cold packs    Visit # 1, 1/10 for progress note   Visits Allowed: 30    Recertification Date: 3081   Physician Follow-Up: Weekly checkups with OB Not required by current recovery housing. Meetings 3x/week. Objective:    OBSERVATION   Pain R knee 6/10, Lumbar pain L2-L5   Palpation Tight lumbar paraspinals bilaterally, tender to palpate. Sensation WFL    Edema    Spinal Accessory Motion    Bed Mobility IND   Transfers IND    Ambulation x200 ft IND. RLE decreased knee flexion and hip circumduction present, external hip rotation   Stairs    Balance Good, eyes closed, narrow stance and perturbations all with no LOB       POSTURE    No Deficit Deficit Comments   Forward Head      Rounded Shoulders      Kyphosis  x    Lordosis      Lateral Shift  x Right   Scoliosis  x Right thoracic curve, left lumbar curve   Iliac Crest  x Right higher than Left standing   PSIS      ASIS      Leg Length Discrp      Slumped Sitting          TRUNK RANGE OF MOTION   Flexion: Positive scoliosis curve toward right  0-50 deg flexion   Extension: 0-25 deg extension   Lateral Flexion Left: WFL   Lateral Flexion Right: WFL    Rotation Left:    Rotation Right:    Trunk Range of Motion is [facility]  []     LOWER EXTREMITY RANGE OF MOTION    Left Right Comments   Hip Flexion knee to chest Full Full     Hip Extension      Hip ABDuction [facility]    Hip ADDuction      Hip Internal Rotation [facility]    Hip External Rotation [facility] WFL    Hip Range of Motion is [facility]  []      Knee Flexion  0-125, slightly stiff    Knee Extension  0 deg neutral    Ankle DF neutral Right, 0-10 deg left.  No clonus felt   Knee Range of Motion is [facility]  []       LOWER EXTREMITY STRENGTH    Left Right Comments   Hip Flexion  3/5    Hip Abduction  4/5    Hip Adduction  4/5    Hip Extension   Bridge [facility]    Hip External Rotation      Knee Flexion  4/5    Knee Extension  4/5 Audible patellar crepitus, mild knee spasticity   Ankle DF  0/5 No trace movement felt   Ankle PF  0/5    LE strength is [facility] []      UPPER EXTREMITY RANGE OF MOTION    Left Right COMMENTS   Shoulder Flexion  0-112 Compensates with R shoulder shrugging Shoulder Extension      Shoulder Abduction  0-98 deg    Shoulder Adduction      Shoulder External Rotation      Shoulder Internal Rotation      Shoulder Range of Motion is [facility]  []      Elbow Flexion  full    Elbow Extension  Lacking 25 deg from 0    Forearm Pronation      Forearm Supination      Elbow Range of Motion is [facility]  []      Wrist Flexion  WFL Supination limited 50%   Wrist Extension  0-20 deg     Wrist Radial Deviation      Wrist Ulnar Deviation      Wrist Range of Motion is [facility]  []   If no measurement is recorded, no formal assessment was completed for that motion. SPECIAL TESTS (+/-)    Left Right Comments   SLR  Neg Neg     90/90 Hamstring length 0-70 0-50    SKTC neg Neg    DKTC      Slump      SI Compression/Distraction      TORRIE Neg Neg    FADIR Neg Neg    Nicola      Darinel      Ely        Right knee special testing - negative Lachmans test, varus/valgus stress test, pivot shift test no pain. Patellar mobilization 50% limited, mild pain with patellar grind test    TREATMENT   Precautions: PREGNANCY - DUE DATE 7/5/2021 - No therapeutic ultrasound or estim to low back, precaution prone lying or long durations of hooklying   Pain: 6/10 back, R knee     X in shaded column indicates activity completed today   Modalities Parameters/  Location  Notes                     Manual Therapy Time/Technique  Notes                     Exercise/Intervention   Notes   HEP demonstrated for patient: hooklying single knee to chest stretch for back   x    Quadruped rocking lateral, stretching back with prayer pose   x To open pelvic floor for upcoming childbirth, improve lumbar pain. Modify pose to be on elbows and knees                                                                    Specific Interventions Next Treatment: OT to address R hand and elbow mobility, splinting.  PT to address R shoulder AAROM (wall slides, pulleys, cane AAROM), lumbar stabilization and bracing (especially once she is postpartum from her childbirth), hip strengthening (clamshell, reverse clam, leg raises all directions, bridge), Right knee VMO strengthening (LAQ with ball squeeze, patellar mobs)    Activity/Treatment Tolerance:  [x]  Patient tolerated treatment well  []  Patient limited by fatigue  []  Patient limited by pain   []  Patient limited by medical complications  []  Other:     Assessment: Patient presents with right arm and leg weakness, thoracolumbar scoliosis, RUE and RLE spasticity consistent with chronic history of cerebral palsy. She is recently moved to the area and pregnant, due with her baby girl July 5, 2021. She wants therapy to help her improve her use of her Right arm and hand and decrease her back and R knee pain so she can care for her daughter. She would benefit from physical therapy to increase lumbar stabilization and core strength, especially once she delivers her infant, as well as to address R shoulder mobility, standing balance, and Right knee stability with squatting and climbing stairs. Body Structures/Functions/Activity Limitations: impaired activity tolerance, impaired motor control, impaired muscle tone, impaired ROM, impaired strength, pain, abnormal gait and abnormal posture    Prognosis: good      GOALS:  Patient Goal: use my arm more to hold my baby, walk better    Short Term Goals:  Time Frame: 4 weeks    Patient will report decreased lower back and Right knee pain from baseline 6/10 to less than 3/10 during therapy session in order to climb stairs and squat up/down with ease to care for her child    Patient will improve B hip PROM to 0-120 deg piriformis figure 4 hip flexion, and 0-80 deg hamstring hip flexion, and 0-25 deg hip extension in order to decrease strain on lumbar spine and improve pelvic alignment.     Patient will increase R shoulder AROM to 0-150 deg flexion, 0-120 deg abduction, ER behind head to T1, IR behind back to L5 in order to reach overhead for household tasks and to care for her  infant. Patient will improve standing balance to 10 sec either SLS in order to step over obstacles and climb stairs safely. Patient will be IND with HEP in order to meet long term goals. Long Term Goals:  Time Frame: 8 weeks    Patient will improve RLE and RUE strength to 4/5 for shoulder, hip, knee in order to transfer off floor and carry child up stairs to access her second level bedroom in group home. Patient will improve score of LEFI questionnaire from baseline 17% impairment to less than 7% in order to squat and perform heavier cleaning tasks, carry her baby. Patient will ascend/descend 12 steps with one railing and reciprocal pattern in order to access second level of home. Patient will squat to lift 20 lb weight from floor to tabletop height with good body mechanics in order to lift her infant with both arms. Patient will demonstrate good cervical and scapular retraction posture during therapy exercises in order to stabilize shoulder joint with reaching and pushing activities. Patient Education:   [x]  HEP/Education Completed: Plan of Care, Goals, verbally given two HEP movements - hooklying knee to chest, and quadruped back stretch (on knees and elbows d/t R hand mobility)   [facility] Access Code:  []  No new Education completed  []  Reviewed Prior HEP      [x]  Patient verbalized and/or demonstrated understanding of education provided. []  Patient unable to verbalize and/or demonstrate understanding of education provided. Will continue education. []  Barriers to learning:     PLAN:  Treatment Recommendations: Strengthening, Range of Motion, Balance Training, Endurance Training, Gait Training, Stair Training, Manual Therapy - Soft Tissue Mobilization, Manual Therapy - Joint Manipulation, Pain Management, Home Exercise Program, Patient Education, Integrative Dry Needling, Aquatics and Modalities    [x]  Plan of care initiated.   Plan to see patient 2 times per week for 8 weeks to address the treatment planned outlined above. []  Continue with current plan of care  [x]  Modify plan of care as follows: called referring physician to request OT to address R hand and elbow mobility, splinting.    []  Hold pending physician visit  []  Discharge    Time In 1300   Time Out 1400   Timed Code Minutes: 10 min   Total Treatment Time: 60 min     Electronically Signed by: Phil Thomas PT

## 2021-06-22 ENCOUNTER — OFFICE VISIT (OUTPATIENT)
Dept: INTERNAL MEDICINE CLINIC | Age: 21
End: 2021-06-22
Payer: COMMERCIAL

## 2021-06-22 VITALS
HEART RATE: 69 BPM | HEIGHT: 63 IN | TEMPERATURE: 97.2 F | SYSTOLIC BLOOD PRESSURE: 122 MMHG | BODY MASS INDEX: 35.79 KG/M2 | DIASTOLIC BLOOD PRESSURE: 79 MMHG | WEIGHT: 202 LBS

## 2021-06-22 DIAGNOSIS — Z51.81 ENCOUNTER FOR MONITORING SUBUTEX MAINTENANCE THERAPY: ICD-10-CM

## 2021-06-22 DIAGNOSIS — Z79.899 ENCOUNTER FOR MONITORING SUBUTEX MAINTENANCE THERAPY: ICD-10-CM

## 2021-06-22 DIAGNOSIS — F11.20 SEVERE OPIOID USE DISORDER (HCC): Primary | ICD-10-CM

## 2021-06-22 DIAGNOSIS — Z3A.38 38 WEEKS GESTATION OF PREGNANCY: ICD-10-CM

## 2021-06-22 PROCEDURE — G8417 CALC BMI ABV UP PARAM F/U: HCPCS | Performed by: INTERNAL MEDICINE

## 2021-06-22 PROCEDURE — 80305 DRUG TEST PRSMV DIR OPT OBS: CPT | Performed by: INTERNAL MEDICINE

## 2021-06-22 PROCEDURE — G8427 DOCREV CUR MEDS BY ELIG CLIN: HCPCS | Performed by: INTERNAL MEDICINE

## 2021-06-22 PROCEDURE — 4004F PT TOBACCO SCREEN RCVD TLK: CPT | Performed by: INTERNAL MEDICINE

## 2021-06-22 PROCEDURE — 99213 OFFICE O/P EST LOW 20 MIN: CPT | Performed by: INTERNAL MEDICINE

## 2021-06-22 RX ORDER — BUPRENORPHINE 2 MG/1
4 TABLET SUBLINGUAL 2 TIMES DAILY
Qty: 40 TABLET | Refills: 0 | Status: SHIPPED | OUTPATIENT
Start: 2021-06-22 | End: 2021-07-02
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
## 2021-06-22 NOTE — PROGRESS NOTES
Verbal order per Dr. Judy Chavez for urine drug screen. Positive for BUP. Verified results with Court B. ,LPN. Dr. Judy Chavez ordered Subutex 4 mg tab BID for patient. Verified dose with patient. Patient was sent home with 2 week scipt for Subutex 2 mg tab (2 tabs BID) and will be seen back in the office on 7/6/21.

## 2021-06-23 ENCOUNTER — ANESTHESIA EVENT (OUTPATIENT)
Dept: LABOR AND DELIVERY | Age: 21
DRG: 560 | End: 2021-06-23
Payer: COMMERCIAL

## 2021-06-23 ENCOUNTER — HOSPITAL ENCOUNTER (OUTPATIENT)
Dept: PHYSICAL THERAPY | Age: 21
Setting detail: THERAPIES SERIES
End: 2021-06-23
Payer: COMMERCIAL

## 2021-06-23 ENCOUNTER — ANESTHESIA (OUTPATIENT)
Dept: LABOR AND DELIVERY | Age: 21
DRG: 560 | End: 2021-06-23
Payer: COMMERCIAL

## 2021-06-23 ENCOUNTER — HOSPITAL ENCOUNTER (INPATIENT)
Age: 21
LOS: 2 days | Discharge: HOME | DRG: 560 | End: 2021-06-25
Attending: OBSTETRICS & GYNECOLOGY | Admitting: OBSTETRICS & GYNECOLOGY
Payer: COMMERCIAL

## 2021-06-23 LAB
ABO: NORMAL
AMPHETAMINE+METHAMPHETAMINE URINE SCREEN: NEGATIVE
ANTIBODY SCREEN: NORMAL
BARBITURATE QUANTITATIVE URINE: NEGATIVE
BASOPHILS # BLD: 0.3 %
BASOPHILS ABSOLUTE: 0 THOU/MM3 (ref 0–0.1)
BENZODIAZEPINE QUANTITATIVE URINE: NEGATIVE
CANNABINOID QUANTITATIVE URINE: NEGATIVE
COCAINE METABOLITE QUANTITATIVE URINE: NEGATIVE
EOSINOPHIL # BLD: 1.3 %
EOSINOPHILS ABSOLUTE: 0.1 THOU/MM3 (ref 0–0.4)
ERYTHROCYTE [DISTWIDTH] IN BLOOD BY AUTOMATED COUNT: 12.7 % (ref 11.5–14.5)
ERYTHROCYTE [DISTWIDTH] IN BLOOD BY AUTOMATED COUNT: 41.9 FL (ref 35–45)
HCT VFR BLD CALC: 42.8 % (ref 37–47)
HEMOGLOBIN: 13.7 GM/DL (ref 12–16)
IMMATURE GRANS (ABS): 0.02 THOU/MM3 (ref 0–0.07)
IMMATURE GRANULOCYTES: 0.2 %
LYMPHOCYTES # BLD: 26.6 %
LYMPHOCYTES ABSOLUTE: 2.5 THOU/MM3 (ref 1–4.8)
MCH RBC QN AUTO: 29.1 PG (ref 26–33)
MCHC RBC AUTO-ENTMCNC: 32 GM/DL (ref 32.2–35.5)
MCV RBC AUTO: 90.9 FL (ref 81–99)
MONOCYTES # BLD: 7.5 %
MONOCYTES ABSOLUTE: 0.7 THOU/MM3 (ref 0.4–1.3)
NUCLEATED RED BLOOD CELLS: 0 /100 WBC
OPIATES, URINE: NEGATIVE
OXYCODONE: NEGATIVE
PHENCYCLIDINE QUANTITATIVE URINE: NEGATIVE
PLATELET # BLD: 278 THOU/MM3 (ref 130–400)
PMV BLD AUTO: 8.7 FL (ref 9.4–12.4)
RBC # BLD: 4.71 MILL/MM3 (ref 4.2–5.4)
RH FACTOR: NORMAL
RPR: NONREACTIVE
SEG NEUTROPHILS: 64.1 %
SEGMENTED NEUTROPHILS ABSOLUTE COUNT: 6 THOU/MM3 (ref 1.8–7.7)
WBC # BLD: 9.4 THOU/MM3 (ref 4.8–10.8)

## 2021-06-23 PROCEDURE — 6360000002 HC RX W HCPCS

## 2021-06-23 PROCEDURE — 80307 DRUG TEST PRSMV CHEM ANLYZR: CPT

## 2021-06-23 PROCEDURE — 36415 COLL VENOUS BLD VENIPUNCTURE: CPT

## 2021-06-23 PROCEDURE — 0UQGXZZ REPAIR VAGINA, EXTERNAL APPROACH: ICD-10-PCS | Performed by: OBSTETRICS & GYNECOLOGY

## 2021-06-23 PROCEDURE — 3E033VJ INTRODUCTION OF OTHER HORMONE INTO PERIPHERAL VEIN, PERCUTANEOUS APPROACH: ICD-10-PCS | Performed by: OBSTETRICS & GYNECOLOGY

## 2021-06-23 PROCEDURE — 86901 BLOOD TYPING SEROLOGIC RH(D): CPT

## 2021-06-23 PROCEDURE — 2580000003 HC RX 258

## 2021-06-23 PROCEDURE — 6370000000 HC RX 637 (ALT 250 FOR IP): Performed by: OBSTETRICS & GYNECOLOGY

## 2021-06-23 PROCEDURE — 86592 SYPHILIS TEST NON-TREP QUAL: CPT

## 2021-06-23 PROCEDURE — 2580000003 HC RX 258: Performed by: OBSTETRICS & GYNECOLOGY

## 2021-06-23 PROCEDURE — 0UQMXZZ REPAIR VULVA, EXTERNAL APPROACH: ICD-10-PCS | Performed by: OBSTETRICS & GYNECOLOGY

## 2021-06-23 PROCEDURE — 10907ZC DRAINAGE OF AMNIOTIC FLUID, THERAPEUTIC FROM PRODUCTS OF CONCEPTION, VIA NATURAL OR ARTIFICIAL OPENING: ICD-10-PCS | Performed by: OBSTETRICS & GYNECOLOGY

## 2021-06-23 PROCEDURE — 1220000001 HC SEMI PRIVATE L&D R&B

## 2021-06-23 PROCEDURE — 86850 RBC ANTIBODY SCREEN: CPT

## 2021-06-23 PROCEDURE — 3700000025 EPIDURAL BLOCK: Performed by: ANESTHESIOLOGY

## 2021-06-23 PROCEDURE — 2500000003 HC RX 250 WO HCPCS: Performed by: ANESTHESIOLOGY

## 2021-06-23 PROCEDURE — 6360000002 HC RX W HCPCS: Performed by: OBSTETRICS & GYNECOLOGY

## 2021-06-23 PROCEDURE — 86900 BLOOD TYPING SEROLOGIC ABO: CPT

## 2021-06-23 PROCEDURE — 85025 COMPLETE CBC W/AUTO DIFF WBC: CPT

## 2021-06-23 PROCEDURE — 7200000001 HC VAGINAL DELIVERY

## 2021-06-23 RX ORDER — DIPHENHYDRAMINE HYDROCHLORIDE 50 MG/ML
25 INJECTION INTRAMUSCULAR; INTRAVENOUS EVERY 6 HOURS PRN
Status: DISCONTINUED | OUTPATIENT
Start: 2021-06-23 | End: 2021-06-24

## 2021-06-23 RX ORDER — ONDANSETRON 2 MG/ML
8 INJECTION INTRAMUSCULAR; INTRAVENOUS EVERY 6 HOURS PRN
Status: DISCONTINUED | OUTPATIENT
Start: 2021-06-23 | End: 2021-06-24 | Stop reason: HOSPADM

## 2021-06-23 RX ORDER — TERBUTALINE SULFATE 1 MG/ML
0.25 INJECTION, SOLUTION SUBCUTANEOUS
Status: ACTIVE | OUTPATIENT
Start: 2021-06-23 | End: 2021-06-23

## 2021-06-23 RX ORDER — CARBOPROST TROMETHAMINE 250 UG/ML
250 INJECTION, SOLUTION INTRAMUSCULAR PRN
Status: DISCONTINUED | OUTPATIENT
Start: 2021-06-23 | End: 2021-06-24 | Stop reason: HOSPADM

## 2021-06-23 RX ORDER — SODIUM CHLORIDE, SODIUM LACTATE, POTASSIUM CHLORIDE, AND CALCIUM CHLORIDE .6; .31; .03; .02 G/100ML; G/100ML; G/100ML; G/100ML
1000 INJECTION, SOLUTION INTRAVENOUS PRN
Status: DISCONTINUED | OUTPATIENT
Start: 2021-06-23 | End: 2021-06-24 | Stop reason: HOSPADM

## 2021-06-23 RX ORDER — IBUPROFEN 800 MG/1
800 TABLET ORAL EVERY 8 HOURS PRN
Status: DISCONTINUED | OUTPATIENT
Start: 2021-06-23 | End: 2021-06-24 | Stop reason: HOSPADM

## 2021-06-23 RX ORDER — ROPIVACAINE HYDROCHLORIDE 2 MG/ML
INJECTION, SOLUTION EPIDURAL; INFILTRATION; PERINEURAL
Status: DISCONTINUED
Start: 2021-06-23 | End: 2021-06-24

## 2021-06-23 RX ORDER — SODIUM CHLORIDE, SODIUM LACTATE, POTASSIUM CHLORIDE, CALCIUM CHLORIDE 600; 310; 30; 20 MG/100ML; MG/100ML; MG/100ML; MG/100ML
INJECTION, SOLUTION INTRAVENOUS CONTINUOUS
Status: DISCONTINUED | OUTPATIENT
Start: 2021-06-23 | End: 2021-06-24

## 2021-06-23 RX ORDER — SEVOFLURANE 250 ML/250ML
1 LIQUID RESPIRATORY (INHALATION) CONTINUOUS PRN
Status: DISCONTINUED | OUTPATIENT
Start: 2021-06-23 | End: 2021-06-24 | Stop reason: HOSPADM

## 2021-06-23 RX ORDER — MISOPROSTOL 200 UG/1
1000 TABLET ORAL PRN
Status: DISCONTINUED | OUTPATIENT
Start: 2021-06-23 | End: 2021-06-24 | Stop reason: HOSPADM

## 2021-06-23 RX ORDER — SODIUM CHLORIDE, SODIUM LACTATE, POTASSIUM CHLORIDE, AND CALCIUM CHLORIDE .6; .31; .03; .02 G/100ML; G/100ML; G/100ML; G/100ML
500 INJECTION, SOLUTION INTRAVENOUS PRN
Status: DISCONTINUED | OUTPATIENT
Start: 2021-06-23 | End: 2021-06-24 | Stop reason: HOSPADM

## 2021-06-23 RX ORDER — ROPIVACAINE HYDROCHLORIDE 2 MG/ML
INJECTION, SOLUTION EPIDURAL; INFILTRATION; PERINEURAL
Status: COMPLETED
Start: 2021-06-23 | End: 2021-06-23

## 2021-06-23 RX ORDER — METHYLERGONOVINE MALEATE 0.2 MG/ML
200 INJECTION INTRAVENOUS PRN
Status: DISCONTINUED | OUTPATIENT
Start: 2021-06-23 | End: 2021-06-24 | Stop reason: HOSPADM

## 2021-06-23 RX ORDER — MORPHINE SULFATE 2 MG/ML
2 INJECTION, SOLUTION INTRAMUSCULAR; INTRAVENOUS
Status: DISCONTINUED | OUTPATIENT
Start: 2021-06-23 | End: 2021-06-24 | Stop reason: HOSPADM

## 2021-06-23 RX ORDER — ACETAMINOPHEN 325 MG/1
650 TABLET ORAL EVERY 4 HOURS PRN
Status: DISCONTINUED | OUTPATIENT
Start: 2021-06-23 | End: 2021-06-24 | Stop reason: HOSPADM

## 2021-06-23 RX ORDER — LIDOCAINE HYDROCHLORIDE 10 MG/ML
30 INJECTION, SOLUTION EPIDURAL; INFILTRATION; INTRACAUDAL; PERINEURAL PRN
Status: COMPLETED | OUTPATIENT
Start: 2021-06-23 | End: 2021-06-24

## 2021-06-23 RX ORDER — BUPRENORPHINE 2 MG/1
4 TABLET SUBLINGUAL 2 TIMES DAILY
Status: DISCONTINUED | OUTPATIENT
Start: 2021-06-23 | End: 2021-06-24

## 2021-06-23 RX ORDER — ONDANSETRON 2 MG/ML
4 INJECTION INTRAMUSCULAR; INTRAVENOUS EVERY 6 HOURS PRN
Status: DISCONTINUED | OUTPATIENT
Start: 2021-06-23 | End: 2021-06-24

## 2021-06-23 RX ORDER — DIPHENHYDRAMINE HYDROCHLORIDE 50 MG/ML
25 INJECTION INTRAMUSCULAR; INTRAVENOUS EVERY 4 HOURS PRN
Status: DISCONTINUED | OUTPATIENT
Start: 2021-06-23 | End: 2021-06-24 | Stop reason: HOSPADM

## 2021-06-23 RX ORDER — FENTANYL CITRATE 50 UG/ML
INJECTION, SOLUTION INTRAMUSCULAR; INTRAVENOUS
Status: DISCONTINUED
Start: 2021-06-23 | End: 2021-06-24

## 2021-06-23 RX ORDER — MORPHINE SULFATE 4 MG/ML
4 INJECTION, SOLUTION INTRAMUSCULAR; INTRAVENOUS
Status: DISCONTINUED | OUTPATIENT
Start: 2021-06-23 | End: 2021-06-24 | Stop reason: HOSPADM

## 2021-06-23 RX ORDER — BUTORPHANOL TARTRATE 1 MG/ML
1 INJECTION, SOLUTION INTRAMUSCULAR; INTRAVENOUS
Status: DISCONTINUED | OUTPATIENT
Start: 2021-06-23 | End: 2021-06-24 | Stop reason: HOSPADM

## 2021-06-23 RX ORDER — NALOXONE HYDROCHLORIDE 0.4 MG/ML
0.4 INJECTION, SOLUTION INTRAMUSCULAR; INTRAVENOUS; SUBCUTANEOUS PRN
Status: DISCONTINUED | OUTPATIENT
Start: 2021-06-23 | End: 2021-06-24

## 2021-06-23 RX ADMIN — SODIUM CHLORIDE, POTASSIUM CHLORIDE, SODIUM LACTATE AND CALCIUM CHLORIDE: 600; 310; 30; 20 INJECTION, SOLUTION INTRAVENOUS at 19:34

## 2021-06-23 RX ADMIN — SODIUM CHLORIDE, POTASSIUM CHLORIDE, SODIUM LACTATE AND CALCIUM CHLORIDE: 600; 310; 30; 20 INJECTION, SOLUTION INTRAVENOUS at 14:49

## 2021-06-23 RX ADMIN — Medication 1 MILLI-UNITS/MIN: at 11:58

## 2021-06-23 RX ADMIN — BUPRENORPHINE HCL 4 MG: 2 TABLET SUBLINGUAL at 20:57

## 2021-06-23 RX ADMIN — ROPIVACAINE HYDROCHLORIDE 20 MG: 2 INJECTION, SOLUTION EPIDURAL; INFILTRATION at 19:33

## 2021-06-23 RX ADMIN — ONDANSETRON 8 MG: 2 INJECTION INTRAMUSCULAR; INTRAVENOUS at 22:00

## 2021-06-23 RX ADMIN — Medication 16 ML: at 19:24

## 2021-06-23 RX ADMIN — SODIUM CHLORIDE, POTASSIUM CHLORIDE, SODIUM LACTATE AND CALCIUM CHLORIDE: 600; 310; 30; 20 INJECTION, SOLUTION INTRAVENOUS at 10:40

## 2021-06-23 NOTE — FLOWSHEET NOTE
Repositioned sitting at edge of bed for epidural placement. Breathing through ctxs. Pt's mom at bedside and supportive.

## 2021-06-23 NOTE — FLOWSHEET NOTE
38+2/7 wks here with Bina Plascencia  of embrace program through [facility], for scheduled induction from Dr office. Baby IUGR and non reactive NST in office today. +fetal movement. Denies ctxs,cramping or leaking of vaginal fluid. States father of baby will be here latter and her mom. FOB lives in [facility]. Verbal consent obtained for drug screen. Oriented to surroundings and plan of care discussed.

## 2021-06-23 NOTE — PLAN OF CARE
Problem: Anxiety:  Goal: Level of anxiety will decrease  Description: Level of anxiety will decrease  Outcome: Ongoing  Note: Calm and co operative. Plan of care and procedures explained. Problem: Breathing Pattern - Ineffective:  Goal: Able to breathe comfortably  Description: Able to breathe comfortably  Outcome: Ongoing  Note: Respirations easy and unlabored. Vital signs stable. Problem: Fluid Volume - Imbalance:  Goal: Absence of intrapartum hemorrhage signs and symptoms  Description: Absence of intrapartum hemorrhage signs and symptoms  Outcome: Ongoing  Note: No vaginal bleeding noted. IV fluids infusing. Vital signs stable. Problem: Infection - Intrapartum Infection:  Goal: Will show no infection signs and symptoms  Description: Will show no infection signs and symptoms  Outcome: Ongoing  Note: Afebrile -GBS     Problem: Labor Process - Prolonged:  Goal: Uterine contractions within specified parameters  Description: Uterine contractions within specified parameters  Outcome: Ongoing  Note: Continuous toco monitoring. Irregular ctxs noted. Pitocin infusing. Problem: Pain - Acute:  Goal: Able to cope with pain  Description: Able to cope with pain  Outcome: Ongoing  Note: Denies pain at this time. Pain goal 6/10. Discussed pain options. Problem: Tissue Perfusion - Uteroplacental, Altered:  Goal: Absence of abnormal fetal heart rate pattern  Description: Absence of abnormal fetal heart rate pattern  Outcome: Ongoing  Note: Continuous fetal monitoring while in labor. FHT's reactive. Problem: Urinary Retention:  Goal: Urinary elimination within specified parameters  Description: Urinary elimination within specified parameters  Outcome: Ongoing  Note: Voiding QS. Problem: Falls - Risk of:  Goal: Will remain free from falls  Description: Will remain free from falls  Outcome: Ongoing  Note: BRP with assist. Side rails up x2. Call light within reach. Embrace  in room. Problem: Discharge Planning:  Goal: Discharged to appropriate level of care  Description: Discharged to appropriate level of care  Outcome: Ongoing  Note: Celso Bynum is aware she will remain on labor/delivery unit for 2 hrs post delivery bonding with baby then transfer to mom/baby unit for continued care til discharge. Care plan reviewed with patient. Patient  verbalize understanding of the plan of care and contribute to goal setting.

## 2021-06-23 NOTE — H&P
[facility]  History and Physical Update    Pt Name: Vasiliy Azar  MRN: [id number]  YOB: 2000  Date of evaluation: 2021    [] I have examined the patient and reviewed the H&P/Consult and there are no changes to the patient or plans. [x] I have examined the patient and reviewed the H&P/Consult and have noted the following changes:  23 yo  @ 38+2 wks presents for IOL for IUGR. GBS negative. Sve: 3-4/70/-3, AROM for clear fluid. FHTs: 130, mod karla, +accels, no decels toco: q2-3 category I tracing. Reactive. con't to work towards delivery. Discussion with the patient and/ or family for proposed care, treatment, services; benefits, risks, side effects; likelihood of achieving goals and potential problems that may occur during recuperation was had and all questions were answered. Discussion with the patient and/ or family of reasonable alternatives to the proposed care, treatment, services and the discussion of the risks, benefits, side effects related to the alternatives and the risk related to not receiving the proposed care treatment services was also had and all questions were answered. If this is for an elective surgical procedure then The patient was counseled at length about the risks of nancy Covid-19 during their perioperative period and any recovery window from their procedure. The patient was made aware that nancy Covid-19  may worsen their prognosis for recovering from their procedure  and lend to a higher morbidity and/or mortality risk. All material risks, benefits, and reasonable alternatives including postponing the procedure were discussed. The patient  does wish to proceed with the procedure at this time.              Estella Edwards MD,MD  Electronically signed 2021 at 5:50 PM

## 2021-06-23 NOTE — FLOWSHEET NOTE
Dr Laura Gil called unit. MD states she is sending over  38+2/7 wks for induction. States NST non reactive in office and baby IUGR. Cervix dilated 3cm/70%/-2 station. Pt  hx bipolar,hypothyroid,hx meth/heroin use. Pt takes subutex. Pt lives at [facility]. -GBS. Orders received.

## 2021-06-24 PROCEDURE — 6370000000 HC RX 637 (ALT 250 FOR IP): Performed by: OBSTETRICS & GYNECOLOGY

## 2021-06-24 PROCEDURE — 90707 MMR VACCINE SC: CPT | Performed by: OBSTETRICS & GYNECOLOGY

## 2021-06-24 PROCEDURE — 2500000003 HC RX 250 WO HCPCS: Performed by: OBSTETRICS & GYNECOLOGY

## 2021-06-24 PROCEDURE — 51701 INSERT BLADDER CATHETER: CPT

## 2021-06-24 PROCEDURE — 90471 IMMUNIZATION ADMIN: CPT | Performed by: OBSTETRICS & GYNECOLOGY

## 2021-06-24 PROCEDURE — 6360000002 HC RX W HCPCS: Performed by: OBSTETRICS & GYNECOLOGY

## 2021-06-24 PROCEDURE — 1200000000 HC SEMI PRIVATE

## 2021-06-24 PROCEDURE — 2580000003 HC RX 258: Performed by: OBSTETRICS & GYNECOLOGY

## 2021-06-24 RX ORDER — METHYLERGONOVINE MALEATE 0.2 MG/ML
200 INJECTION INTRAVENOUS PRN
Status: DISCONTINUED | OUTPATIENT
Start: 2021-06-24 | End: 2021-06-25 | Stop reason: HOSPADM

## 2021-06-24 RX ORDER — QUETIAPINE FUMARATE 25 MG/1
25 TABLET, FILM COATED ORAL NIGHTLY
Status: DISCONTINUED | OUTPATIENT
Start: 2021-06-24 | End: 2021-06-25 | Stop reason: HOSPADM

## 2021-06-24 RX ORDER — DOCUSATE SODIUM 100 MG/1
100 CAPSULE, LIQUID FILLED ORAL 2 TIMES DAILY PRN
Status: DISCONTINUED | OUTPATIENT
Start: 2021-06-24 | End: 2021-06-25 | Stop reason: HOSPADM

## 2021-06-24 RX ORDER — ONDANSETRON 4 MG/1
8 TABLET, ORALLY DISINTEGRATING ORAL EVERY 8 HOURS PRN
Status: DISCONTINUED | OUTPATIENT
Start: 2021-06-24 | End: 2021-06-25 | Stop reason: HOSPADM

## 2021-06-24 RX ORDER — MODIFIED LANOLIN
OINTMENT (GRAM) TOPICAL PRN
Status: DISCONTINUED | OUTPATIENT
Start: 2021-06-24 | End: 2021-06-25 | Stop reason: HOSPADM

## 2021-06-24 RX ORDER — HYDROCODONE BITARTRATE AND ACETAMINOPHEN 5; 325 MG/1; MG/1
1 TABLET ORAL EVERY 4 HOURS PRN
Status: DISCONTINUED | OUTPATIENT
Start: 2021-06-24 | End: 2021-06-25 | Stop reason: HOSPADM

## 2021-06-24 RX ORDER — SODIUM CHLORIDE 0.9 % (FLUSH) 0.9 %
10 SYRINGE (ML) INJECTION PRN
Status: DISCONTINUED | OUTPATIENT
Start: 2021-06-24 | End: 2021-06-24

## 2021-06-24 RX ORDER — SODIUM CHLORIDE 9 MG/ML
25 INJECTION, SOLUTION INTRAVENOUS PRN
Status: DISCONTINUED | OUTPATIENT
Start: 2021-06-24 | End: 2021-06-24

## 2021-06-24 RX ORDER — BUPRENORPHINE HYDROCHLORIDE 8 MG/1
4 TABLET SUBLINGUAL 2 TIMES DAILY
Status: DISCONTINUED | OUTPATIENT
Start: 2021-06-24 | End: 2021-06-25 | Stop reason: HOSPADM

## 2021-06-24 RX ORDER — HYDROCODONE BITARTRATE AND ACETAMINOPHEN 5; 325 MG/1; MG/1
2 TABLET ORAL EVERY 4 HOURS PRN
Status: DISCONTINUED | OUTPATIENT
Start: 2021-06-24 | End: 2021-06-25 | Stop reason: HOSPADM

## 2021-06-24 RX ORDER — ONDANSETRON 2 MG/ML
4 INJECTION INTRAMUSCULAR; INTRAVENOUS EVERY 6 HOURS PRN
Status: DISCONTINUED | OUTPATIENT
Start: 2021-06-24 | End: 2021-06-25 | Stop reason: HOSPADM

## 2021-06-24 RX ORDER — SODIUM CHLORIDE 0.9 % (FLUSH) 0.9 %
10 SYRINGE (ML) INJECTION EVERY 12 HOURS SCHEDULED
Status: DISCONTINUED | OUTPATIENT
Start: 2021-06-24 | End: 2021-06-24

## 2021-06-24 RX ORDER — IBUPROFEN 800 MG/1
800 TABLET ORAL 3 TIMES DAILY
Status: DISCONTINUED | OUTPATIENT
Start: 2021-06-24 | End: 2021-06-25 | Stop reason: HOSPADM

## 2021-06-24 RX ORDER — ACETAMINOPHEN 325 MG/1
650 TABLET ORAL EVERY 4 HOURS PRN
Status: DISCONTINUED | OUTPATIENT
Start: 2021-06-24 | End: 2021-06-25 | Stop reason: HOSPADM

## 2021-06-24 RX ORDER — SODIUM CHLORIDE, SODIUM LACTATE, POTASSIUM CHLORIDE, CALCIUM CHLORIDE 600; 310; 30; 20 MG/100ML; MG/100ML; MG/100ML; MG/100ML
INJECTION, SOLUTION INTRAVENOUS CONTINUOUS
Status: DISCONTINUED | OUTPATIENT
Start: 2021-06-24 | End: 2021-06-25 | Stop reason: HOSPADM

## 2021-06-24 RX ORDER — CARBOPROST TROMETHAMINE 250 UG/ML
250 INJECTION, SOLUTION INTRAMUSCULAR PRN
Status: DISCONTINUED | OUTPATIENT
Start: 2021-06-24 | End: 2021-06-25 | Stop reason: HOSPADM

## 2021-06-24 RX ORDER — FERROUS SULFATE 325(65) MG
325 TABLET ORAL
Status: DISCONTINUED | OUTPATIENT
Start: 2021-06-24 | End: 2021-06-25 | Stop reason: HOSPADM

## 2021-06-24 RX ORDER — LEVOTHYROXINE SODIUM 0.07 MG/1
75 TABLET ORAL DAILY
Status: DISCONTINUED | OUTPATIENT
Start: 2021-06-24 | End: 2021-06-25 | Stop reason: HOSPADM

## 2021-06-24 RX ORDER — TRAZODONE HYDROCHLORIDE 50 MG/1
50 TABLET ORAL NIGHTLY
Status: DISCONTINUED | OUTPATIENT
Start: 2021-06-24 | End: 2021-06-25 | Stop reason: HOSPADM

## 2021-06-24 RX ORDER — MISOPROSTOL 200 UG/1
800 TABLET ORAL PRN
Status: DISCONTINUED | OUTPATIENT
Start: 2021-06-24 | End: 2021-06-25 | Stop reason: HOSPADM

## 2021-06-24 RX ORDER — MORPHINE SULFATE 4 MG/ML
2 INJECTION, SOLUTION INTRAMUSCULAR; INTRAVENOUS
Status: DISCONTINUED | OUTPATIENT
Start: 2021-06-24 | End: 2021-06-25 | Stop reason: HOSPADM

## 2021-06-24 RX ORDER — FLUOXETINE HYDROCHLORIDE 20 MG/1
40 CAPSULE ORAL DAILY
Status: DISCONTINUED | OUTPATIENT
Start: 2021-06-24 | End: 2021-06-25 | Stop reason: HOSPADM

## 2021-06-24 RX ORDER — MORPHINE SULFATE 4 MG/ML
4 INJECTION, SOLUTION INTRAMUSCULAR; INTRAVENOUS
Status: DISCONTINUED | OUTPATIENT
Start: 2021-06-24 | End: 2021-06-25 | Stop reason: HOSPADM

## 2021-06-24 RX ADMIN — LIDOCAINE HYDROCHLORIDE 30 ML: 10 INJECTION, SOLUTION EPIDURAL; INFILTRATION; INTRACAUDAL; PERINEURAL at 00:04

## 2021-06-24 RX ADMIN — LEVOTHYROXINE SODIUM 75 MCG: 0.07 TABLET ORAL at 06:19

## 2021-06-24 RX ADMIN — DOCUSATE SODIUM 100 MG: 100 CAPSULE, LIQUID FILLED ORAL at 20:30

## 2021-06-24 RX ADMIN — Medication 166.7 ML: at 00:04

## 2021-06-24 RX ADMIN — SODIUM CHLORIDE, POTASSIUM CHLORIDE, SODIUM LACTATE AND CALCIUM CHLORIDE: 600; 310; 30; 20 INJECTION, SOLUTION INTRAVENOUS at 00:40

## 2021-06-24 RX ADMIN — DOCUSATE SODIUM 100 MG: 100 CAPSULE, LIQUID FILLED ORAL at 09:25

## 2021-06-24 RX ADMIN — HYDROCODONE BITARTRATE AND ACETAMINOPHEN 2 TABLET: 5; 325 TABLET ORAL at 14:42

## 2021-06-24 RX ADMIN — IBUPROFEN 800 MG: 800 TABLET, FILM COATED ORAL at 03:08

## 2021-06-24 RX ADMIN — HYDROCODONE BITARTRATE AND ACETAMINOPHEN 2 TABLET: 5; 325 TABLET ORAL at 09:26

## 2021-06-24 RX ADMIN — FLUOXETINE HYDROCHLORIDE 40 MG: 20 CAPSULE ORAL at 09:31

## 2021-06-24 RX ADMIN — MEASLES, MUMPS, AND RUBELLA VIRUS VACCINE LIVE 0.5 ML: 1000; 12500; 1000 INJECTION, POWDER, LYOPHILIZED, FOR SUSPENSION SUBCUTANEOUS at 13:24

## 2021-06-24 RX ADMIN — BUPRENORPHINE HCL 4 MG: 8 TABLET SUBLINGUAL at 20:30

## 2021-06-24 RX ADMIN — BUPRENORPHINE HCL 4 MG: 8 TABLET SUBLINGUAL at 09:26

## 2021-06-24 RX ADMIN — IBUPROFEN 800 MG: 800 TABLET, FILM COATED ORAL at 20:30

## 2021-06-24 RX ADMIN — IBUPROFEN 800 MG: 800 TABLET, FILM COATED ORAL at 12:15

## 2021-06-24 RX ADMIN — QUETIAPINE FUMARATE 25 MG: 25 TABLET ORAL at 20:30

## 2021-06-24 RX ADMIN — Medication 87.3 MILLI-UNITS/MIN: at 00:16

## 2021-06-24 RX ADMIN — TRAZODONE HYDROCHLORIDE 50 MG: 50 TABLET ORAL at 20:30

## 2021-06-24 RX ADMIN — HYDROCODONE BITARTRATE AND ACETAMINOPHEN 2 TABLET: 5; 325 TABLET ORAL at 03:08

## 2021-06-24 ASSESSMENT — PAIN SCALES - GENERAL
PAINLEVEL_OUTOF10: 7
PAINLEVEL_OUTOF10: 6
PAINLEVEL_OUTOF10: 7

## 2021-06-24 ASSESSMENT — PAIN DESCRIPTION - FREQUENCY: FREQUENCY: CONTINUOUS

## 2021-06-24 ASSESSMENT — PAIN DESCRIPTION - DESCRIPTORS: DESCRIPTORS: SORE

## 2021-06-24 ASSESSMENT — PAIN DESCRIPTION - PROGRESSION: CLINICAL_PROGRESSION: GRADUALLY WORSENING

## 2021-06-24 ASSESSMENT — PAIN DESCRIPTION - ONSET: ONSET: ON-GOING

## 2021-06-24 ASSESSMENT — PAIN DESCRIPTION - LOCATION: LOCATION: PERINEUM

## 2021-06-24 ASSESSMENT — PAIN DESCRIPTION - PAIN TYPE: TYPE: ACUTE PAIN

## 2021-06-24 ASSESSMENT — PAIN - FUNCTIONAL ASSESSMENT: PAIN_FUNCTIONAL_ASSESSMENT: ACTIVITIES ARE NOT PREVENTED

## 2021-06-24 NOTE — FLOWSHEET NOTE
Shelley care done. New gown applied, pad changed, tucks applied. Pt up to wheelchair for transport to mom and baby.

## 2021-06-24 NOTE — PLAN OF CARE
Problem: Pain:  Goal: Pain level will decrease  Description: Pain level will decrease  6/24/2021 1755 by Andrew Barrientos RN  Outcome: Ongoing  Note: Goal is 6, pt taking norco and resting for pain relief     Problem: Discharge Planning:  Goal: Discharged to appropriate level of care  Description: Discharged to appropriate level of care  6/24/2021 1755 by Andrew Barrientos RN  Outcome: Ongoing  Note: Plan of care discussed. Problem: Constipation:  Goal: Bowel elimination is within specified parameters  Description: Bowel elimination is within specified parameters  6/24/2021 1755 by Andrew Barrientos RN  Outcome: Ongoing  Note: Encouraged fluids and fiber     Problem: Fluid Volume - Imbalance:  Goal: Absence of postpartum hemorrhage signs and symptoms  Description: Absence of postpartum hemorrhage signs and symptoms  6/24/2021 1755 by Andrew Barrientos RN  Outcome: Ongoing  Note: Minimal bleeding noted     Problem: Infection - Risk of, Puerperal Infection:  Goal: Will show no infection signs and symptoms  Description: Will show no infection signs and symptoms  6/24/2021 1755 by Andrew Barrientos RN  Outcome: Ongoing  Note: No foul smelling drainage noted     Problem: Mood - Altered:  Goal: Mood stable  Description: Mood stable  6/24/2021 1755 by Andrew Barrientos RN  Outcome: Ongoing  Note: Bonding well with infant   Care plan reviewed with patient. Patient verbalize understanding of the plan of care and contribute to goal setting.

## 2021-06-24 NOTE — FLOWSHEET NOTE
Pt tearful and not getting any relief from epidural. RN educated pt on option to redo epidural. Pt refusing another epidural at this time.

## 2021-06-24 NOTE — PROGRESS NOTES
Called for delivery. On arrival at bedside, pt complete and pushing adequately with contractions at +4 station. FHTs: 150, mod karla, no accels, prolonged deceleration toco: q2-3 min. Category II tracing. Discussed concern with prolonged decelerations and discussed VAVD-fetal and maternal risks/benefits. Patient agreeable. During process of setting up vacuum, patient pushed strongly and infant delivered quickly-see delivery note for details.      Natalie Cruz MD  12:57 AM  6/24/2021
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
## 2021-06-24 NOTE — ANESTHESIA POSTPROCEDURE EVALUATION
Department of Anesthesiology  Postprocedure Note    Patient: Tierra Esquivel  MRN: [id number]  YOB: 2000  Date of evaluation: 6/24/2021  Time:  8:13 AM     Procedure Summary     Date: 06/23/21 Room / Location:     Anesthesia Start: 1911 Anesthesia Stop: 2358    Procedure: Labor Analgesia Diagnosis:     Scheduled Providers:  Responsible Provider: Alisha Larry MD    Anesthesia Type: epidural ASA Status: 2          Anesthesia Type: epidural    Chrissy Phase I: Chrissy Score: 9    Chrissy Phase II:      Last vitals: Reviewed and per EMR flowsheets.        Anesthesia Post Evaluation    Patient location during evaluation: floor  Patient participation: complete - patient participated  Level of consciousness: awake and alert  Airway patency: patent  Nausea & Vomiting: no nausea and no vomiting  Complications: no  Cardiovascular status: hemodynamically stable  Respiratory status: acceptable and room air  Hydration status: euvolemic

## 2021-06-24 NOTE — FLOWSHEET NOTE
Pt calls out stating starting to feel sharp pain in lower vagina. Pt states using PCA button with no relief. RN talks to pt about options of epidural redose, Stadol or Nitrous Oxide.  Pt would like to try epidural redose first.

## 2021-06-24 NOTE — PROGRESS NOTES
Department of Obstetrics and Gynecology  Progress Note      S: doing well. No complaints. Lochia appropriate. Denies cp, sob, ct. Breast feeding female infant. O:   Vitals:    21 0926   BP: 100/60   Pulse: 62   Resp: 18   Temp: 97.8 °F (36.6 °C)   SpO2:        Gen: no acute distress   Resp: breathing unlabored   Abd: soft, nondistended, fundus firm below umbilicus    A: 24 y.o.   PPD#1 s/p , IOL for IUGR,doing well. P:   1. B POS   2. Con't postpartum care   3.  Anticipate d/c home tomorrow    Berna Covington MD  11:10 AM  2021

## 2021-06-24 NOTE — PLAN OF CARE
Problem: Anxiety:  Goal: Level of anxiety will decrease  Description: Level of anxiety will decrease  6/23/2021 2156 by Padma Barry RN  Outcome: Ongoing  Note: Pt remains calm about the birthing experience,  at bedside, supportive. All questions/concerns addressed by RN. Problem: Breathing Pattern - Ineffective:  Goal: Able to breathe comfortably  Description: Able to breathe comfortably  6/23/2021 2156 by Padma Barry RN  Outcome: Ongoing  Note: No signs of resp distress noted. Sp02 remains greater than 92% on room air. Respirations equal and unlabored. Problem: Fluid Volume - Imbalance:  Goal: Absence of intrapartum hemorrhage signs and symptoms  Description: Absence of intrapartum hemorrhage signs and symptoms  6/23/2021 2156 by Padma Barry RN  Outcome: Ongoing  Note: No vaginal bleeding noted, will continue to monitor. Problem: Infection - Intrapartum Infection:  Goal: Will show no infection signs and symptoms  Description: Will show no infection signs and symptoms  6/23/2021 2156 by Padma Barry RN  Outcome: Ongoing  Note: Vitals stable, pt afebrile, -GBS         Problem: Labor Process - Prolonged:  Goal: Uterine contractions within specified parameters  Description: Uterine contractions within specified parameters  6/23/2021 2156 by Padma Barry RN  Outcome: Ongoing  Note: IUPC in place to monitor contractions. Pitocin running and titrated per order        Problem: Pain - Acute:  Goal: Able to cope with pain  Description: Able to cope with pain  6/23/2021 2156 by Padma Barry RN  Outcome: Ongoing  Note: Pt states pain goal is a 6/10. Pt has epidural      Problem: Tissue Perfusion - Uteroplacental, Altered:  Goal: Absence of abnormal fetal heart rate pattern  Description: Absence of abnormal fetal heart rate pattern  6/23/2021 2156 by Padma Barry RN  Outcome: Ongoing  Note: Fetal Heart Tones remain reassuring. Continuous EFM in place.         Problem: Urinary Retention:  Goal: Urinary elimination within specified parameters  Description: Urinary elimination within specified parameters  6/23/2021 2156 by Alex Clarke RN  Outcome: Ongoing  Note: Pt has chavis catheter in place draining sufficient amount of clear yellow urine        Problem: Falls - Risk of:  Goal: Will remain free from falls  Description: Will remain free from falls  6/23/2021 2156 by Alex Clarke RN  Outcome: Ongoing  Note: Pt. Remains free from falls at this time. IV infusing per order. RN encouraged pt. To call for assistance to BR. Side rails up X2. Call light within reach. S.O. At bedside. RN will continue to provide for a safe environment. Problem: Discharge Planning:  Goal: Discharged to appropriate level of care  Description: Discharged to appropriate level of care  6/23/2021 2156 by Alex Clarke RN  Outcome: Ongoing  Note: Pt aware of 2hr recovery period in L&D and then transfer to mom/baby for the remainder of her stay. Care plan reviewed with patient. Patient verbalize understanding of the plan of care and contribute to goal setting.

## 2021-06-24 NOTE — ANESTHESIA PROCEDURE NOTES
Epidural Block    Patient location during procedure: OB  Start time: 6/23/2021 7:11 PM  End time: 6/23/2021 7:24 PM  Reason for block: labor epidural  Staffing  Performed: anesthesiologist   Anesthesiologist: Renetta Izaugirre MD  Preanesthetic Checklist  Completed: patient identified, IV checked, site marked, risks and benefits discussed, surgical consent, monitors and equipment checked, pre-op evaluation, timeout performed, anesthesia consent given, oxygen available and patient being monitored  Epidural  Patient position: sitting  Prep: ChloraPrep  Patient monitoring: continuous pulse ox and frequent blood pressure checks  Approach: midline  Location: lumbar (1-5)  Injection technique: CONCEPCION saline  Provider prep: mask and sterile gloves  Needle  Needle type: Tuohy   Needle gauge: 18 G  Needle length: 3.5 in  Needle insertion depth: 9 cm  Catheter type: side hole  Catheter at skin depth: 12 cm  Test dose: negative  Assessment  Hemodynamics: stable  Attempts: 2

## 2021-06-24 NOTE — FLOWSHEET NOTE
Pt up to bathroom with assist for second time to void a moderate amount without difficulty. Small amount of rubra lochia. Pt instructed on carlyn care, voiced understanding. Pt returned to bed and fundus firm and U/-1. Tolerated well. Pt denied needs at this time.

## 2021-06-24 NOTE — PLAN OF CARE
Problem: Pain:  Goal: Pain level will decrease  Description: Pain level will decrease  Outcome: Ongoing  Note: Managing pain with Motrin, Norco, and rest; Maintaining pain within pain goal of 6/10 with interventions       Problem: Discharge Planning:  Goal: Discharged to appropriate level of care  Description: Discharged to appropriate level of care  Outcome: Ongoing  Note: Working towards discharge home with family; needs addressed with mother; ducks in a row discussed        Problem: Constipation:  Goal: Bowel elimination is within specified parameters  Description: Bowel elimination is within specified parameters  Outcome: Ongoing  Note: Increasing fluids and ambulation. Problem: Fluid Volume - Imbalance:  Goal: Absence of postpartum hemorrhage signs and symptoms  Description: Absence of postpartum hemorrhage signs and symptoms  Outcome: Ongoing  Note: Small amount of lochia rubra noted. Vitals stable. Problem: Infection - Risk of, Puerperal Infection:  Goal: Will show no infection signs and symptoms  Description: Will show no infection signs and symptoms  Outcome: Ongoing  Note: Vital WNL; no s/sx of infection noted       Problem: Mood - Altered:  Goal: Mood stable  Description: Mood stable  Outcome: Ongoing  Note: Calm and cooperative; bonding well with infant    Plan of care discussed with mother and she contributes to goal setting and voices understanding of plan of care.

## 2021-06-24 NOTE — PLAN OF CARE
Problem: Pain:  Goal: Pain level will decrease  Description: Pain level will decrease  6/24/2021 1100 by Erika Correa RN  Outcome: Ongoing  Note: Pain controlled with po meds. Discussed ice for perineal pain or the use of warm blanket/heating pad for uterine cramps. Pt states her pain goal 4/10 has been met. Problem: Discharge Planning:  Goal: Discharged to appropriate level of care  Description: Discharged to appropriate level of care  6/24/2021 1100 by Erika Correa RN  Outcome: Ongoing  Note: Pt working towards discharge goals     Problem: Constipation:  Goal: Bowel elimination is within specified parameters  Description: Bowel elimination is within specified parameters  6/24/2021 1100 by Erika Correa RN  Outcome: Ongoing  Note: Pt states passing gas     Problem: Fluid Volume - Imbalance:  Goal: Absence of postpartum hemorrhage signs and symptoms  Description: Absence of postpartum hemorrhage signs and symptoms  6/24/2021 1100 by Erika Correa RN  Outcome: Ongoing  Note: Pt states small amount of vaginal bleeding      Problem: Infection - Risk of, Puerperal Infection:  Goal: Will show no infection signs and symptoms  Description: Will show no infection signs and symptoms  6/24/2021 1100 by Erika Correa RN  Outcome: Ongoing  Note: Pt vitals stable      Problem: Mood - Altered:  Goal: Mood stable  Description: Mood stable  6/24/2021 1100 by Erika Correa RN  Outcome: Ongoing  Note: Pt calm and happy    Plan of care reviewed with mother and/or legal guardian. Questions & concerns addressed with verbalized understanding from mother and/or legal guardian. Mother and/or legal guardian participated in goal setting for their baby.

## 2021-06-24 NOTE — L&D DELIVERY NOTE
Department of Obstetrics and Gynecology   Vaginal Delivery Note at [facility]    Date of Delivery:  21    Procedure:  Spontaneous vaginal delivery and Repair of right vaginal wall, left labial and left periurethral spontaneous lacerations    Surgeon:  Gio Goodwin MD    Anesthesia:  epidural anesthesia    Estimated blood loss:  400ml    Cord blood sent Yes    Complications:  IUGR    Condition:  infant stable to general nursery and mother stable    Details of Procedure: The patient is a 24 y.o.  female at 38w3d  who was admitted for IOL for IUGR. She received the following interventions: ARBOW and IV Pitocin induction. The patient progressed well,did receive an epidural, became complete and started to push. Patient progressed well and the fetal head was delivered over an intact perineum, and the rest of the infant delivered atraumatically, placed on mother abdomen. The cord was clamped and cut after 1 minute and infant handed off to the waiting nurse for evaluation. The delivery of the placenta was spontaneous. Cord segment and cord blood collected. The perineum and vagina were explored and a right vaginal wall, left labial and left periurethral spontaneous lacerations were repaired in standard fashion with 3-0 vicryl. A vaginal sweep was completed and 4 sharps were placed in the proper container. Sponge count correct. Infant Wt: 6#5    APGARSGeno Levels Girl Terressa Avendano [[id number]]    Apgars    Living status: Living  Apgars   1 Minute:  5 Minute:  10 Minute 15 Minute 20 Minute   Skin Color: 0  1       Heart Rate: 2  2       Reflex Irritability: 2  2       Muscle Tone: 2  2       Respiratory Effort: 2  2       Total: 8  9               Apgars Assigned By: JUAN M Ty RN              Electronically signed by Gio Goodwin MD on 2021 at 12:58 AM

## 2021-06-24 NOTE — FLOWSHEET NOTE
Patient up to bathroom for first time. Ambulated to bathroom without difficulty. Voided small amount. Patient educated on carlyn care, use of carlyn bottle, extra pads, emergency call light, and to call with any increased bleeding or clots. Patient states understanding. Ambulated back to bed without difficulty.  Fundal check post void at U/U.

## 2021-06-25 ENCOUNTER — APPOINTMENT (OUTPATIENT)
Dept: PHYSICAL THERAPY | Age: 21
End: 2021-06-25
Payer: COMMERCIAL

## 2021-06-25 VITALS
HEIGHT: 63 IN | RESPIRATION RATE: 18 BRPM | TEMPERATURE: 98.5 F | OXYGEN SATURATION: 99 % | HEART RATE: 76 BPM | BODY MASS INDEX: 35.79 KG/M2 | DIASTOLIC BLOOD PRESSURE: 68 MMHG | SYSTOLIC BLOOD PRESSURE: 117 MMHG | WEIGHT: 202 LBS

## 2021-06-25 LAB
HCT VFR BLD CALC: 34.8 % (ref 37–47)
HEMOGLOBIN: 11 GM/DL (ref 12–16)

## 2021-06-25 PROCEDURE — 90471 IMMUNIZATION ADMIN: CPT

## 2021-06-25 PROCEDURE — 6360000002 HC RX W HCPCS

## 2021-06-25 PROCEDURE — 85018 HEMOGLOBIN: CPT

## 2021-06-25 PROCEDURE — 90715 TDAP VACCINE 7 YRS/> IM: CPT

## 2021-06-25 PROCEDURE — 85014 HEMATOCRIT: CPT

## 2021-06-25 PROCEDURE — 36415 COLL VENOUS BLD VENIPUNCTURE: CPT

## 2021-06-25 PROCEDURE — 6360000002 HC RX W HCPCS: Performed by: OBSTETRICS & GYNECOLOGY

## 2021-06-25 PROCEDURE — 6370000000 HC RX 637 (ALT 250 FOR IP): Performed by: OBSTETRICS & GYNECOLOGY

## 2021-06-25 RX ORDER — ACETAMINOPHEN 325 MG/1
650 TABLET ORAL EVERY 6 HOURS PRN
Qty: 120 TABLET | Refills: 3 | COMMUNITY
Start: 2021-06-25

## 2021-06-25 RX ORDER — IBUPROFEN 600 MG/1
600 TABLET ORAL EVERY 6 HOURS PRN
Qty: 30 TABLET | Refills: 1 | COMMUNITY
Start: 2021-06-25

## 2021-06-25 RX ADMIN — FLUOXETINE HYDROCHLORIDE 40 MG: 20 CAPSULE ORAL at 10:07

## 2021-06-25 RX ADMIN — LEVOTHYROXINE SODIUM 75 MCG: 0.07 TABLET ORAL at 06:23

## 2021-06-25 RX ADMIN — TETANUS TOXOID, REDUCED DIPHTHERIA TOXOID AND ACELLULAR PERTUSSIS VACCINE, ADSORBED 0.5 ML: 5; 2.5; 8; 8; 2.5 SUSPENSION INTRAMUSCULAR at 11:29

## 2021-06-25 RX ADMIN — DOCUSATE SODIUM 100 MG: 100 CAPSULE, LIQUID FILLED ORAL at 10:06

## 2021-06-25 RX ADMIN — IBUPROFEN 800 MG: 800 TABLET, FILM COATED ORAL at 10:06

## 2021-06-25 RX ADMIN — BUPRENORPHINE HCL 4 MG: 8 TABLET SUBLINGUAL at 10:06

## 2021-06-25 ASSESSMENT — PAIN SCALES - GENERAL
PAINLEVEL_OUTOF10: 3
PAINLEVEL_OUTOF10: 0

## 2021-06-25 NOTE — DISCHARGE SUMMARY
Obstetric Discharge Summary      Pt Name: Tammi Gil  MRN: [id number] [place] #: [de-identified]  YOB: 2000        Admitting Diagnosis  24 y.o.  @ 38+2 wks IUP presented with IOL for IUGR. Reasons for Admission on 2021 10:22 AM  Vaginal Delivery    Hospital course: 24 y.o.  @ 38+2 wks IUP presented with IOL for IUGR She progressed well and delivered via . Her post partum course was uncomplicated. She was discharged home on PPD#2 in good condition. Postpartum/Operative Complications  none    Discharge Diagnosis  IUGR  female infant      Discharge Information  Current Discharge Medication List      START taking these medications    Details   ibuprofen (ADVIL;MOTRIN) 600 MG tablet Take 1 tablet by mouth every 6 hours as needed for Pain  Qty: 30 tablet, Refills: 1      acetaminophen (AMINOFEN) 325 MG tablet Take 2 tablets by mouth every 6 hours as needed for Pain  Qty: 120 tablet, Refills: 3         CONTINUE these medications which have NOT CHANGED    Details   buprenorphine (SUBUTEX) 2 MG SUBL SL tablet Place 2 tablets under the tongue 2 times daily for 10 days. Qty: 40 tablet, Refills: 0    Comments: [id number]  Associated Diagnoses: Severe opioid use disorder (HCC)      Levothyroxine Sodium 75 MCG CAPS Take by mouth      FLUoxetine (PROZAC) 20 MG capsule Take 40 mg by mouth daily      Prenatal Vit-DSS-Fe Cbn-FA (PRENATAL AD PO) Take by mouth      QUEtiapine (SEROQUEL) 100 MG tablet Take 25 mg by mouth nightly       traZODone (DESYREL) 50 MG tablet                Diet: regular  Activity: nothing in the vagina for 6 weeks-no sex, no tampons, no douching, no heavy lifting, limited activity for 2-3 weeks  Discharge to:  home  Follow up in 6 wks.     Shakila Ricardo MD  8:40 AM  2021

## 2021-06-25 NOTE — PLAN OF CARE
Problem: Pain:  Goal: Pain level will decrease  Description: Pain level will decrease  6/24/2021 2213 by Frida Le RN  Outcome: Ongoing  Note: Pain controlled with po meds. Discussed ice for perineal pain and/or the use of warm blanket/heating pad for uterine cramps. Pt states her pain goal 6/10 has not been met. Problem: Discharge Planning:  Goal: Discharged to appropriate level of care  Description: Discharged to appropriate level of care  6/24/2021 2213 by Debora Castro RN  Outcome: Ongoing  Note: Remains in hospital, discussed possible discharge needs. Problem: Constipation:  Goal: Bowel elimination is within specified parameters  Description: Bowel elimination is within specified parameters  6/24/2021 2213 by Debora Castro RN  Outcome: Ongoing  Note: Taking stool softeners and increasing fiber and fluid in diet. Ambulation encouraged. Problem: Fluid Volume - Imbalance:  Goal: Absence of postpartum hemorrhage signs and symptoms  Description: Absence of postpartum hemorrhage signs and symptoms  6/24/2021 2213 by Debora Castro RN  Outcome: Ongoing  Note: Vaginal bleeding WNL, Fundus firm and midline, no clots or foul odors. Problem: Infection - Risk of, Puerperal Infection:  Goal: Will show no infection signs and symptoms  Description: Will show no infection signs and symptoms  6/24/2021 2213 by Debora Castro RN  Outcome: Ongoing  Note: Vaginal bleeding WNL, Fundus firm and midline, no clots or foul odors. Vital signs and assessments WNL. Problem: Mood - Altered:  Goal: Mood stable  Description: Mood stable  6/24/2021 2213 by Frida Le RN  Outcome: Ongoing  Note: Infant has been in well baby nursery with RN so far this shift. Care plan reviewed with patient and she contributes to goal setting and voices understanding of plan of care.

## 2021-06-25 NOTE — FLOWSHEET NOTE
Post birth warning signs education paper given and reviewed, teaching complete. [place] postpartum depression screening discussed with patient, instructed to contact her healthcare provider if her score is > 10. Patient voiced understanding. Mother's blood type is B+. Baby's blood type is not tested.   Mother did NOT receive Rhogam.

## 2021-06-25 NOTE — PLAN OF CARE
Care plan reviewed with patient and she contributes to goal setting and voices understanding of plan of care.      Problem: Pain:  Goal: Pain level will decrease  Description: Pain level will decrease  6/25/2021 1059 by Negrita Dwyer RN  Outcome: Ongoing  Note: Taking motrin with relief, subutex bid as ordered, pain goal met     Problem: Discharge Planning:  Goal: Discharged to appropriate level of care  Description: Discharged to appropriate level of care  6/25/2021 1059 by Negrita Dwyer RN  Outcome: Ongoing  Note: Discharge order written for today     Problem: Constipation:  Goal: Bowel elimination is within specified parameters  Description: Bowel elimination is within specified parameters  6/25/2021 1059 by Negrita Dwyer RN  Outcome: Ongoing  Note: Passing gas, present BS, colace given     Problem: Fluid Volume - Imbalance:  Goal: Absence of postpartum hemorrhage signs and symptoms  Description: Absence of postpartum hemorrhage signs and symptoms  6/25/2021 1059 by Negrita Dwyer RN  Outcome: Ongoing  Note: Small amount of vaginal bleeding, denies gushes or clots     Problem: Infection - Risk of, Puerperal Infection:  Goal: Will show no infection signs and symptoms  Description: Will show no infection signs and symptoms  6/25/2021 1059 by Negrita Dwyer RN  Outcome: Ongoing  Note: Afebrile, stable VS     Problem: Mood - Altered:  Goal: Mood stable  Description: Mood stable  6/25/2021 1059 by Negrita Dwyer RN  Outcome: Ongoing  Note: Calm and cooperative with staff, edinburgh scale reviewed

## 2021-06-30 ENCOUNTER — APPOINTMENT (OUTPATIENT)
Dept: PHYSICAL THERAPY | Age: 21
End: 2021-06-30
Payer: COMMERCIAL

## 2023-12-07 DIAGNOSIS — G81.10 SPASTIC HEMIPARESIS AFFECTING DOMINANT SIDE (MULTI): ICD-10-CM

## 2023-12-07 DIAGNOSIS — G80.1: Primary | ICD-10-CM

## 2023-12-07 RX ORDER — BACLOFEN 10 MG/1
10 TABLET ORAL 2 TIMES DAILY
Qty: 60 TABLET | Refills: 11 | Status: SHIPPED | OUTPATIENT
Start: 2023-12-07